# Patient Record
Sex: FEMALE | Race: WHITE | NOT HISPANIC OR LATINO | URBAN - METROPOLITAN AREA
[De-identification: names, ages, dates, MRNs, and addresses within clinical notes are randomized per-mention and may not be internally consistent; named-entity substitution may affect disease eponyms.]

---

## 2017-03-03 ENCOUNTER — APPOINTMENT (OUTPATIENT)
Dept: URBAN - METROPOLITAN AREA CLINIC 200 | Age: 62
Setting detail: DERMATOLOGY
End: 2017-03-03

## 2017-03-03 DIAGNOSIS — Z41.9 ENCOUNTER FOR PROCEDURE FOR PURPOSES OTHER THAN REMEDYING HEALTH STATE, UNSPECIFIED: ICD-10-CM

## 2017-03-03 DIAGNOSIS — L57.0 ACTINIC KERATOSIS: ICD-10-CM

## 2017-03-03 DIAGNOSIS — L57.8 OTHER SKIN CHANGES DUE TO CHRONIC EXPOSURE TO NONIONIZING RADIATION: ICD-10-CM

## 2017-03-03 DIAGNOSIS — D22 MELANOCYTIC NEVI: ICD-10-CM

## 2017-03-03 PROBLEM — D22.5 MELANOCYTIC NEVI OF TRUNK: Status: ACTIVE | Noted: 2017-03-03

## 2017-03-03 PROCEDURE — OTHER LIQUID NITROGEN (COSMETIC): OTHER

## 2017-03-03 PROCEDURE — 99213 OFFICE O/P EST LOW 20 MIN: CPT | Mod: 25

## 2017-03-03 PROCEDURE — 17000 DESTRUCT PREMALG LESION: CPT

## 2017-03-03 PROCEDURE — 17003 DESTRUCT PREMALG LES 2-14: CPT

## 2017-03-03 PROCEDURE — OTHER LIQUID NITROGEN: OTHER

## 2017-03-03 PROCEDURE — OTHER COUNSELING: OTHER

## 2017-03-03 ASSESSMENT — LOCATION DETAILED DESCRIPTION DERM
LOCATION DETAILED: LEFT LATERAL SUPERIOR CHEST
LOCATION DETAILED: UPPER STERNUM
LOCATION DETAILED: RIGHT SUPERIOR FOREHEAD
LOCATION DETAILED: RIGHT POSTERIOR SHOULDER
LOCATION DETAILED: UPPER STERNUM

## 2017-03-03 ASSESSMENT — LOCATION ZONE DERM
LOCATION ZONE: TRUNK
LOCATION ZONE: FACE
LOCATION ZONE: TRUNK
LOCATION ZONE: ARM

## 2017-03-03 ASSESSMENT — LOCATION SIMPLE DESCRIPTION DERM
LOCATION SIMPLE: RIGHT FOREHEAD
LOCATION SIMPLE: CHEST
LOCATION SIMPLE: CHEST
LOCATION SIMPLE: RIGHT SHOULDER

## 2017-03-03 NOTE — PROCEDURE: LIQUID NITROGEN (COSMETIC)
Render Post-Care Instructions In Note?: no
Detail Level: Zone
Price (Use Numbers Only, No Special Characters Or $): 75.00
Post-Care Instructions: I reviewed with the patient in detail post-care instructions. Patient is to wear sunprotection, and avoid picking at any of the treated lesions. Pt may apply Vaseline to crusted or scabbing areas.
Consent: The patient's consent was obtained including but not limited to risks of crusting, scabbing, blistering, scarring, darker or lighter pigmentary change, recurrence, incomplete removal and infection. The patient understands that the procedure is cosmetic in nature and is not covered by insurance.

## 2017-10-10 ENCOUNTER — APPOINTMENT (OUTPATIENT)
Dept: URBAN - METROPOLITAN AREA CLINIC 200 | Age: 62
Setting detail: DERMATOLOGY
End: 2017-10-15

## 2017-10-10 DIAGNOSIS — L57.0 ACTINIC KERATOSIS: ICD-10-CM

## 2017-10-10 DIAGNOSIS — L57.8 OTHER SKIN CHANGES DUE TO CHRONIC EXPOSURE TO NONIONIZING RADIATION: ICD-10-CM

## 2017-10-10 DIAGNOSIS — L20.84 INTRINSIC (ALLERGIC) ECZEMA: ICD-10-CM

## 2017-10-10 DIAGNOSIS — L81.4 OTHER MELANIN HYPERPIGMENTATION: ICD-10-CM

## 2017-10-10 PROBLEM — Z85.828 PERSONAL HISTORY OF OTHER MALIGNANT NEOPLASM OF SKIN: Status: ACTIVE | Noted: 2017-10-10

## 2017-10-10 PROCEDURE — 99213 OFFICE O/P EST LOW 20 MIN: CPT | Mod: 25

## 2017-10-10 PROCEDURE — OTHER COUNSELING: OTHER

## 2017-10-10 PROCEDURE — OTHER LIQUID NITROGEN: OTHER

## 2017-10-10 PROCEDURE — OTHER OBSERVATION: OTHER

## 2017-10-10 PROCEDURE — 17003 DESTRUCT PREMALG LES 2-14: CPT

## 2017-10-10 PROCEDURE — 17000 DESTRUCT PREMALG LESION: CPT

## 2017-10-10 PROCEDURE — OTHER PRESCRIPTION: OTHER

## 2017-10-10 PROCEDURE — OTHER OBSERVATION AND MEASURE: OTHER

## 2017-10-10 RX ORDER — TRIAMCINOLONE ACETONIDE 1 MG/G
CREAM TOPICAL
Qty: 1 | Refills: 6 | Status: ERX

## 2017-10-10 ASSESSMENT — LOCATION ZONE DERM
LOCATION ZONE: FACE
LOCATION ZONE: TRUNK
LOCATION ZONE: NECK

## 2017-10-10 ASSESSMENT — LOCATION SIMPLE DESCRIPTION DERM
LOCATION SIMPLE: RIGHT UPPER BACK
LOCATION SIMPLE: LEFT CHEEK
LOCATION SIMPLE: LEFT ZYGOMA
LOCATION SIMPLE: LEFT ANTERIOR NECK
LOCATION SIMPLE: NECK
LOCATION SIMPLE: CHEST

## 2017-10-10 ASSESSMENT — LOCATION DETAILED DESCRIPTION DERM
LOCATION DETAILED: LEFT CENTRAL LATERAL NECK
LOCATION DETAILED: LEFT SUPERIOR CENTRAL MALAR CHEEK
LOCATION DETAILED: RIGHT MEDIAL SUPERIOR CHEST
LOCATION DETAILED: LEFT MEDIAL SUPERIOR CHEST
LOCATION DETAILED: MIDDLE STERNUM
LOCATION DETAILED: LEFT SUPERIOR LATERAL NECK
LOCATION DETAILED: RIGHT MID-UPPER BACK
LOCATION DETAILED: LEFT CENTRAL ZYGOMA

## 2017-10-10 NOTE — PROCEDURE: LIQUID NITROGEN
Post-Care Instructions: I reviewed with the patient in detail post-care instructions. Patient is to wear sunprotection, and avoid picking at any of the treated lesions. Pt may apply Vaseline to crusted or scabbing areas.
Number Of Freeze-Thaw Cycles: 2 freeze-thaw cycles
Render Post-Care Instructions In Note?: no
Detail Level: Detailed
Duration Of Freeze Thaw-Cycle (Seconds): 10
Consent: The patient's consent was obtained including but not limited to risks of crusting, scabbing, blistering, scarring, darker or lighter pigmentary change, recurrence, incomplete removal and infection.

## 2018-11-12 ENCOUNTER — APPOINTMENT (OUTPATIENT)
Dept: URBAN - METROPOLITAN AREA CLINIC 200 | Age: 63
Setting detail: DERMATOLOGY
End: 2018-11-15

## 2018-11-12 DIAGNOSIS — L57.8 OTHER SKIN CHANGES DUE TO CHRONIC EXPOSURE TO NONIONIZING RADIATION: ICD-10-CM

## 2018-11-12 DIAGNOSIS — L57.0 ACTINIC KERATOSIS: ICD-10-CM

## 2018-11-12 PROCEDURE — 17003 DESTRUCT PREMALG LES 2-14: CPT

## 2018-11-12 PROCEDURE — 99213 OFFICE O/P EST LOW 20 MIN: CPT | Mod: 25

## 2018-11-12 PROCEDURE — 17000 DESTRUCT PREMALG LESION: CPT

## 2018-11-12 PROCEDURE — OTHER COUNSELING: OTHER

## 2018-11-12 PROCEDURE — OTHER MIPS QUALITY: OTHER

## 2018-11-12 PROCEDURE — OTHER LIQUID NITROGEN: OTHER

## 2018-11-12 ASSESSMENT — LOCATION ZONE DERM
LOCATION ZONE: TRUNK
LOCATION ZONE: FACE
LOCATION ZONE: NECK

## 2018-11-12 ASSESSMENT — LOCATION SIMPLE DESCRIPTION DERM
LOCATION SIMPLE: RIGHT CHEEK
LOCATION SIMPLE: NECK
LOCATION SIMPLE: CHEST
LOCATION SIMPLE: LEFT CHEEK

## 2018-11-12 ASSESSMENT — LOCATION DETAILED DESCRIPTION DERM
LOCATION DETAILED: RIGHT SUPERIOR LATERAL NECK
LOCATION DETAILED: RIGHT SUPERIOR CENTRAL MALAR CHEEK
LOCATION DETAILED: LEFT MEDIAL SUPERIOR CHEST
LOCATION DETAILED: LEFT SUPERIOR MEDIAL BUCCAL CHEEK

## 2019-04-30 ENCOUNTER — PATIENT OUTREACH (OUTPATIENT)
Dept: FAMILY MEDICINE | Facility: CLINIC | Age: 64
End: 2019-04-30

## 2019-04-30 NOTE — PROGRESS NOTES
Care Gap Team has outreached to Silke Ashraf on behalf of their primary care provider.      Care Gap Source:: IPPIP Gap Report         Care Gap Status:: Due    Outreach via:: Telephone    Adult Preventive Wellness Protocol(s) Used: : Breast Cancer Screening    Chart Review Completed:: Yes  Patient interview completed:: Yes  Inclusion Criteria:: Met  Exclusion Criteria: None  Patient educated on recommended care:: Yes       Provided order(s) for:: none    Provided clinical referral(s) for:: None    Appointment provided:: No                Addressed patient's questions and concerns. Patient states that she is no longer a patient with the practice.

## 2019-05-13 ENCOUNTER — APPOINTMENT (OUTPATIENT)
Dept: URBAN - METROPOLITAN AREA CLINIC 200 | Age: 64
Setting detail: DERMATOLOGY
End: 2019-05-14

## 2019-05-13 DIAGNOSIS — L57.8 OTHER SKIN CHANGES DUE TO CHRONIC EXPOSURE TO NONIONIZING RADIATION: ICD-10-CM

## 2019-05-13 DIAGNOSIS — L57.0 ACTINIC KERATOSIS: ICD-10-CM

## 2019-05-13 DIAGNOSIS — Z85.828 PERSONAL HISTORY OF OTHER MALIGNANT NEOPLASM OF SKIN: ICD-10-CM

## 2019-05-13 PROCEDURE — 17003 DESTRUCT PREMALG LES 2-14: CPT

## 2019-05-13 PROCEDURE — OTHER COUNSELING: OTHER

## 2019-05-13 PROCEDURE — 17000 DESTRUCT PREMALG LESION: CPT

## 2019-05-13 PROCEDURE — OTHER LIQUID NITROGEN: OTHER

## 2019-05-13 PROCEDURE — 99213 OFFICE O/P EST LOW 20 MIN: CPT | Mod: 25

## 2019-05-13 PROCEDURE — OTHER MIPS QUALITY: OTHER

## 2019-05-13 ASSESSMENT — LOCATION SIMPLE DESCRIPTION DERM
LOCATION SIMPLE: CHEST
LOCATION SIMPLE: RIGHT ANTERIOR NECK
LOCATION SIMPLE: LEFT ANTERIOR NECK
LOCATION SIMPLE: RIGHT LOWER LEG

## 2019-05-13 ASSESSMENT — LOCATION DETAILED DESCRIPTION DERM
LOCATION DETAILED: RIGHT CLAVICULAR NECK
LOCATION DETAILED: LEFT MEDIAL SUPERIOR CHEST
LOCATION DETAILED: RIGHT LATERAL DISTAL CALF
LOCATION DETAILED: LEFT INFERIOR ANTERIOR NECK

## 2019-05-13 ASSESSMENT — LOCATION ZONE DERM
LOCATION ZONE: LEG
LOCATION ZONE: NECK
LOCATION ZONE: TRUNK

## 2019-07-01 NOTE — PROCEDURE: LIQUID NITROGEN
Case management 7/1  Talked with mother as she had left a  requesting a call back. She reported that she was told that we were reassessing today with potential discharge on Tuesday. She reported that Tuesday works best for her as she has made arrangements to take off early. She has something going tonight and would have difficulty picking her up. Chhaya's intent at this time is to take Arlene and her on a road trip to clear the air and to work on communication. She is planning a on a long weekend trip and then they will be back before the intake scheduled on 7/9 with [facility]. Informed her that I was going to team at 1030 and will contact her afterwards. Informed her that my conversation with Dayanna last Friday was that they working on scheduling MSFT. Chhaya reported that she is going to contact Day[facility] to learn more.    [facility] and she reported that they have a MSFT therapist and that person is going to contact mother to set up appointments. Dayanna requested the discharge summary with recommendations. Informed her that I will send those to her after discharge.    Talked with mother Chhaya and made arrangements for her to pick Arlene up on Tuesday 7/2 1400.   
Detail Level: Generalized
Post-Care Instructions: I reviewed with the patient in detail post-care instructions. Patient is to wear sunprotection, and avoid picking at any of the treated lesions. Pt may apply Vaseline to crusted or scabbing areas.
Render Post-Care Instructions In Note?: no
Number Of Freeze-Thaw Cycles: 2 freeze-thaw cycles
Consent: The patient's consent was obtained including but not limited to risks of crusting, scabbing, blistering, scarring, darker or lighter pigmentary change, recurrence, incomplete removal and infection.
Duration Of Freeze Thaw-Cycle (Seconds): 10

## 2019-11-18 ENCOUNTER — APPOINTMENT (OUTPATIENT)
Dept: URBAN - METROPOLITAN AREA CLINIC 200 | Age: 64
Setting detail: DERMATOLOGY
End: 2019-11-26

## 2019-11-18 DIAGNOSIS — L57.8 OTHER SKIN CHANGES DUE TO CHRONIC EXPOSURE TO NONIONIZING RADIATION: ICD-10-CM

## 2019-11-18 DIAGNOSIS — L20.84 INTRINSIC (ALLERGIC) ECZEMA: ICD-10-CM

## 2019-11-18 DIAGNOSIS — Z85.828 PERSONAL HISTORY OF OTHER MALIGNANT NEOPLASM OF SKIN: ICD-10-CM

## 2019-11-18 PROBLEM — L57.0 ACTINIC KERATOSIS: Status: ACTIVE | Noted: 2019-11-18

## 2019-11-18 PROCEDURE — OTHER PRESCRIPTION: OTHER

## 2019-11-18 PROCEDURE — 99213 OFFICE O/P EST LOW 20 MIN: CPT

## 2019-11-18 PROCEDURE — OTHER COUNSELING: OTHER

## 2019-11-18 RX ORDER — BETAMETHASONE DIPROPIONATE 0.5 MG/G
CREAM TOPICAL
Qty: 1 | Refills: 6 | Status: ERX | COMMUNITY
Start: 2019-11-18

## 2019-11-18 ASSESSMENT — LOCATION ZONE DERM
LOCATION ZONE: TRUNK
LOCATION ZONE: NECK

## 2019-11-18 ASSESSMENT — LOCATION DETAILED DESCRIPTION DERM
LOCATION DETAILED: UPPER STERNUM
LOCATION DETAILED: LEFT MEDIAL SUPERIOR CHEST
LOCATION DETAILED: LEFT INFERIOR ANTERIOR NECK

## 2019-11-18 ASSESSMENT — LOCATION SIMPLE DESCRIPTION DERM
LOCATION SIMPLE: CHEST
LOCATION SIMPLE: LEFT ANTERIOR NECK

## 2019-11-18 ASSESSMENT — BSA RASH: BSA RASH: 24

## 2020-04-15 ENCOUNTER — RX ONLY (RX ONLY)
Age: 65
End: 2020-04-15

## 2020-04-15 RX ORDER — BETAMETHASONE DIPROPIONATE 0.5 MG/G
CREAM TOPICAL
Qty: 1 | Refills: 6 | Status: ERX

## 2020-07-09 ENCOUNTER — APPOINTMENT (OUTPATIENT)
Dept: URBAN - METROPOLITAN AREA CLINIC 200 | Age: 65
Setting detail: DERMATOLOGY
End: 2020-07-19

## 2020-07-09 ENCOUNTER — RX ONLY (RX ONLY)
Age: 65
End: 2020-07-09

## 2020-07-09 DIAGNOSIS — L57.0 ACTINIC KERATOSIS: ICD-10-CM

## 2020-07-09 DIAGNOSIS — L20.84 INTRINSIC (ALLERGIC) ECZEMA: ICD-10-CM

## 2020-07-09 DIAGNOSIS — B07.8 OTHER VIRAL WARTS: ICD-10-CM

## 2020-07-09 PROCEDURE — 17003 DESTRUCT PREMALG LES 2-14: CPT | Mod: 59

## 2020-07-09 PROCEDURE — 17110 DESTRUCT B9 LESION 1-14: CPT

## 2020-07-09 PROCEDURE — OTHER PRESCRIPTION MEDICATION MANAGEMENT: OTHER

## 2020-07-09 PROCEDURE — 17000 DESTRUCT PREMALG LESION: CPT | Mod: 59

## 2020-07-09 PROCEDURE — OTHER LIQUID NITROGEN: OTHER

## 2020-07-09 PROCEDURE — OTHER PRESCRIPTION: OTHER

## 2020-07-09 PROCEDURE — 99213 OFFICE O/P EST LOW 20 MIN: CPT | Mod: 25

## 2020-07-09 RX ORDER — CLOBETASOL PROPIONATE 0.5 MG/G
CREAM TOPICAL
Qty: 1 | Refills: 6 | Status: ERX | COMMUNITY
Start: 2020-07-09

## 2020-07-09 RX ORDER — BETAMETHASONE DIPROPIONATE 0.5 MG/G
CREAM, AUGMENTED TOPICAL
Qty: 1 | Refills: 6 | Status: ERX

## 2020-07-09 RX ORDER — LORATADINE 10 MG
TABLET ORAL
Qty: 120 | Refills: 6 | COMMUNITY
Start: 2020-07-09

## 2020-07-09 ASSESSMENT — LOCATION DETAILED DESCRIPTION DERM
LOCATION DETAILED: RIGHT DISTAL POSTERIOR UPPER ARM
LOCATION DETAILED: LEFT ANTECUBITAL SKIN
LOCATION DETAILED: LEFT PROXIMAL PRETIBIAL REGION
LOCATION DETAILED: LEFT CENTRAL MALAR CHEEK
LOCATION DETAILED: RIGHT RADIAL DORSAL HAND
LOCATION DETAILED: LEFT PROXIMAL PRETIBIAL REGION
LOCATION DETAILED: LEFT DISTAL CALF
LOCATION DETAILED: LEFT DISTAL PRETIBIAL REGION
LOCATION DETAILED: LEFT PROXIMAL CALF
LOCATION DETAILED: LEFT SUPERIOR LATERAL NECK
LOCATION DETAILED: LEFT POSTERIOR SHOULDER

## 2020-07-09 ASSESSMENT — LOCATION SIMPLE DESCRIPTION DERM
LOCATION SIMPLE: LEFT PRETIBIAL REGION
LOCATION SIMPLE: RIGHT POSTERIOR UPPER ARM
LOCATION SIMPLE: LEFT CHEEK
LOCATION SIMPLE: LEFT PRETIBIAL REGION
LOCATION SIMPLE: NECK
LOCATION SIMPLE: LEFT SHOULDER
LOCATION SIMPLE: LEFT CALF
LOCATION SIMPLE: RIGHT HAND
LOCATION SIMPLE: LEFT UPPER ARM

## 2020-07-09 ASSESSMENT — LOCATION ZONE DERM
LOCATION ZONE: LEG
LOCATION ZONE: FACE
LOCATION ZONE: NECK
LOCATION ZONE: HAND
LOCATION ZONE: ARM
LOCATION ZONE: LEG

## 2020-07-09 NOTE — PROCEDURE: LIQUID NITROGEN
Include Z78.9 (Other Specified Conditions Influencing Health Status) As An Associated Diagnosis?: Yes
Add 52 Modifier (Optional): no
Number Of Freeze-Thaw Cycles: 1 freeze-thaw cycle
Post-Care Instructions: I reviewed with the patient in detail post-care instructions. Patient is to wear sunprotection, and avoid picking at any of the treated lesions. Pt may apply Vaseline to crusted or scabbing areas.
Number Of Freeze-Thaw Cycles: 2 freeze-thaw cycles
Consent: The patient's consent was obtained including but not limited to risks of crusting, scabbing, blistering, scarring, darker or lighter pigmentary change, recurrence, incomplete removal and infection.
Medical Necessity Clause: This procedure was medically necessary because the lesions that were treated were: causing pain
Detail Level: Detailed
Duration Of Freeze Thaw-Cycle (Seconds): 2
Medical Necessity Information: It is in your best interest to select a reason for this procedure from the list below. All of these items fulfill various CMS LCD requirements except the new and changing color options.

## 2020-12-30 ENCOUNTER — APPOINTMENT (OUTPATIENT)
Dept: URBAN - METROPOLITAN AREA CLINIC 200 | Age: 65
Setting detail: DERMATOLOGY
End: 2020-12-31

## 2020-12-30 DIAGNOSIS — L28.0 LICHEN SIMPLEX CHRONICUS: ICD-10-CM

## 2020-12-30 DIAGNOSIS — L20.84 INTRINSIC (ALLERGIC) ECZEMA: ICD-10-CM

## 2020-12-30 DIAGNOSIS — Z85.828 PERSONAL HISTORY OF OTHER MALIGNANT NEOPLASM OF SKIN: ICD-10-CM

## 2020-12-30 DIAGNOSIS — L57.8 OTHER SKIN CHANGES DUE TO CHRONIC EXPOSURE TO NONIONIZING RADIATION: ICD-10-CM

## 2020-12-30 PROBLEM — L57.0 ACTINIC KERATOSIS: Status: ACTIVE | Noted: 2020-12-30

## 2020-12-30 PROCEDURE — 17110 DESTRUCT B9 LESION 1-14: CPT

## 2020-12-30 PROCEDURE — 99213 OFFICE O/P EST LOW 20 MIN: CPT | Mod: 25

## 2020-12-30 PROCEDURE — OTHER COUNSELING: OTHER

## 2020-12-30 PROCEDURE — OTHER MIPS QUALITY: OTHER

## 2020-12-30 PROCEDURE — OTHER LIQUID NITROGEN: OTHER

## 2020-12-30 ASSESSMENT — LOCATION SIMPLE DESCRIPTION DERM
LOCATION SIMPLE: CHEST
LOCATION SIMPLE: LEFT HAND
LOCATION SIMPLE: LEFT ANTERIOR NECK
LOCATION SIMPLE: RIGHT UPPER ARM
LOCATION SIMPLE: LEFT THIGH
LOCATION SIMPLE: LEFT PRETIBIAL REGION
LOCATION SIMPLE: RIGHT THIGH
LOCATION SIMPLE: LEFT UPPER ARM

## 2020-12-30 ASSESSMENT — LOCATION DETAILED DESCRIPTION DERM
LOCATION DETAILED: LEFT MEDIAL SUPERIOR CHEST
LOCATION DETAILED: LEFT PROXIMAL PRETIBIAL REGION
LOCATION DETAILED: LEFT INFERIOR ANTERIOR NECK
LOCATION DETAILED: LEFT RADIAL DORSAL HAND
LOCATION DETAILED: LEFT ANTERIOR DISTAL THIGH
LOCATION DETAILED: RIGHT ANTERIOR DISTAL THIGH
LOCATION DETAILED: RIGHT ANTERIOR DISTAL UPPER ARM
LOCATION DETAILED: LEFT ANTERIOR DISTAL UPPER ARM

## 2020-12-30 ASSESSMENT — LOCATION ZONE DERM
LOCATION ZONE: NECK
LOCATION ZONE: ARM
LOCATION ZONE: LEG
LOCATION ZONE: ARM
LOCATION ZONE: TRUNK
LOCATION ZONE: HAND
LOCATION ZONE: LEG

## 2020-12-30 NOTE — PROCEDURE: COUNSELING
Detail Level: Zone
Detail Level: Generalized
Patient Specific Counseling (Will Not Stick From Patient To Patient): Allegra 120mg

## 2020-12-30 NOTE — PROCEDURE: LIQUID NITROGEN
Medical Necessity Information: It is in your best interest to select a reason for this procedure from the list below. All of these items fulfill various CMS LCD requirements except the new and changing color options.
Medical Necessity Clause: This procedure was medically necessary because the lesions that were treated were: causing pain
Number Of Freeze-Thaw Cycles: 2 freeze-thaw cycles
Render Post-Care Instructions In Note?: no
Consent: The patient's consent was obtained including but not limited to risks of crusting, scabbing, blistering, scarring, darker or lighter pigmentary change, recurrence, incomplete removal and infection.
Include Z78.9 (Other Specified Conditions Influencing Health Status) As An Associated Diagnosis?: Yes
Post-Care Instructions: I reviewed with the patient in detail post-care instructions. Patient is to wear sunprotection, and avoid picking at any of the treated lesions. Pt may apply Vaseline to crusted or scabbing areas.
Detail Level: Detailed

## 2021-07-08 ENCOUNTER — APPOINTMENT (OUTPATIENT)
Dept: URBAN - METROPOLITAN AREA CLINIC 200 | Age: 66
Setting detail: DERMATOLOGY
End: 2021-07-08

## 2021-07-08 DIAGNOSIS — Z11.52 ENCOUNTER FOR SCREENING FOR COVID-19: ICD-10-CM

## 2021-07-08 DIAGNOSIS — L57.8 OTHER SKIN CHANGES DUE TO CHRONIC EXPOSURE TO NONIONIZING RADIATION: ICD-10-CM

## 2021-07-08 DIAGNOSIS — Z85.828 PERSONAL HISTORY OF OTHER MALIGNANT NEOPLASM OF SKIN: ICD-10-CM

## 2021-07-08 DIAGNOSIS — L20.84 INTRINSIC (ALLERGIC) ECZEMA: ICD-10-CM

## 2021-07-08 DIAGNOSIS — D485 NEOPLASM OF UNCERTAIN BEHAVIOR OF SKIN: ICD-10-CM

## 2021-07-08 PROBLEM — L57.0 ACTINIC KERATOSIS: Status: ACTIVE | Noted: 2021-07-08

## 2021-07-08 PROBLEM — D48.5 NEOPLASM OF UNCERTAIN BEHAVIOR OF SKIN: Status: ACTIVE | Noted: 2021-07-08

## 2021-07-08 PROCEDURE — OTHER SCREENING FOR COVID-19: OTHER

## 2021-07-08 PROCEDURE — OTHER SUNSCREEN RECOMMENDATIONS: OTHER

## 2021-07-08 PROCEDURE — OTHER PRESCRIPTION MEDICATION MANAGEMENT: OTHER

## 2021-07-08 PROCEDURE — OTHER BIOPSY BY SHAVE METHOD: OTHER

## 2021-07-08 PROCEDURE — 11103 TANGNTL BX SKIN EA SEP/ADDL: CPT

## 2021-07-08 PROCEDURE — OTHER COUNSELING: OTHER

## 2021-07-08 PROCEDURE — OTHER PRESCRIPTION: OTHER

## 2021-07-08 PROCEDURE — 99213 OFFICE O/P EST LOW 20 MIN: CPT | Mod: 25

## 2021-07-08 PROCEDURE — OTHER PHOTO-DOCUMENTATION: OTHER

## 2021-07-08 PROCEDURE — 11102 TANGNTL BX SKIN SINGLE LES: CPT

## 2021-07-08 ASSESSMENT — LOCATION DETAILED DESCRIPTION DERM
LOCATION DETAILED: RIGHT DISTAL PRETIBIAL REGION
LOCATION DETAILED: LEFT INFERIOR ANTERIOR NECK
LOCATION DETAILED: PERIUMBILICAL SKIN
LOCATION DETAILED: LEFT INFRAMAMMARY CREASE (OUTER QUADRANT)

## 2021-07-08 ASSESSMENT — LOCATION SIMPLE DESCRIPTION DERM
LOCATION SIMPLE: RIGHT PRETIBIAL REGION
LOCATION SIMPLE: LEFT ANTERIOR NECK
LOCATION SIMPLE: ABDOMEN
LOCATION SIMPLE: LEFT BREAST

## 2021-07-08 ASSESSMENT — PAIN INTENSITY VAS: HOW INTENSE IS YOUR PAIN 0 BEING NO PAIN, 10 BEING THE MOST SEVERE PAIN POSSIBLE?: NO PAIN

## 2021-07-08 ASSESSMENT — LOCATION ZONE DERM
LOCATION ZONE: TRUNK
LOCATION ZONE: LEG
LOCATION ZONE: NECK

## 2021-07-08 NOTE — PROCEDURE: BIOPSY BY SHAVE METHOD
Electrodesiccation And Curettage Text: The wound bed was treated with electrodesiccation and curettage after the biopsy was performed.
Type Of Destruction Used: Curettage
Depth Of Biopsy: dermis
Validate That Anesthesia Is Not 0: No
Validate Note Data (See Information Below): Yes
Dressing: bandage
Wound Care: Aquaphor
Electrodesiccation Text: The wound bed was treated with electrodesiccation after the biopsy was performed.
Anesthesia Volume In Cc (Will Not Render If 0): 0.5
Additional Anesthesia Volume In Cc (Will Not Render If 0): 0
Cryotherapy Text: The wound bed was treated with cryotherapy after the biopsy was performed.
Consent: Written consent was obtained and risks were reviewed including but not limited to scarring, infection, bleeding, scabbing, incomplete removal, nerve damage and allergy to anesthesia.
Notification Instructions: Patient will be notified of biopsy results. However, patient instructed to call the office if not contacted within 2 weeks.
Post-Care Instructions: I reviewed with the patient in detail post-care instructions. Patient is to keep the biopsy site dry overnight, and then apply bacitracin twice daily until healed. Patient may apply hydrogen peroxide soaks to remove any crusting.
Detail Level: Detailed
Curettage Text: The wound bed was treated with curettage after the biopsy was performed.
Biopsy Type: H and E
Hemostasis: Drysol
Information: Selecting Yes will display possible errors in your note based on the variables you have selected. This validation is only offered as a suggestion for you. PLEASE NOTE THAT THE VALIDATION TEXT WILL BE REMOVED WHEN YOU FINALIZE YOUR NOTE. IF YOU WANT TO FAX A PRELIMINARY NOTE YOU WILL NEED TO TOGGLE THIS TO 'NO' IF YOU DO NOT WANT IT IN YOUR FAXED NOTE.
Anesthesia Type: 0.5% lidocaine with 1:200,000 epinephrine
Biopsy Method: sterile single edge surgical blade
Silver Nitrate Text: The wound bed was treated with silver nitrate after the biopsy was performed.
Billing Type: Third-Party Bill

## 2021-07-08 NOTE — PROCEDURE: PRESCRIPTION MEDICATION MANAGEMENT
Render In Strict Bullet Format?: No
Initiate Treatment: doxepin/zinc \\n5% doxepin HCl (bulk) 2.25g in 94% Moisturizing Cream 42.3g, 1% pyrithione zinc (bulk)
Detail Level: Detailed

## 2021-07-08 NOTE — PROCEDURE: SCREENING FOR COVID-19
Detail Level: Generalized
Previous Infection Text: The patient has recovered from a previous COVID-19 infection.
Has The Patient Been Infected With Covid-19 In The Past?: No

## 2021-10-07 ENCOUNTER — APPOINTMENT (OUTPATIENT)
Dept: URBAN - METROPOLITAN AREA CLINIC 200 | Age: 66
Setting detail: DERMATOLOGY
End: 2021-10-08

## 2021-10-07 DIAGNOSIS — I78.1 NEVUS, NON-NEOPLASTIC: ICD-10-CM

## 2021-10-07 DIAGNOSIS — Z11.52 ENCOUNTER FOR SCREENING FOR COVID-19: ICD-10-CM

## 2021-10-07 PROBLEM — L57.0 ACTINIC KERATOSIS: Status: ACTIVE | Noted: 2021-10-07

## 2021-10-07 PROCEDURE — 99212 OFFICE O/P EST SF 10 MIN: CPT

## 2021-10-07 PROCEDURE — OTHER SCREENING FOR COVID-19: OTHER

## 2021-10-07 PROCEDURE — OTHER COUNSELING: OTHER

## 2021-10-07 PROCEDURE — OTHER REASSURANCE: OTHER

## 2021-10-07 ASSESSMENT — LOCATION SIMPLE DESCRIPTION DERM
LOCATION SIMPLE: ABDOMEN
LOCATION SIMPLE: RIGHT CHEEK

## 2021-10-07 ASSESSMENT — LOCATION ZONE DERM
LOCATION ZONE: FACE
LOCATION ZONE: TRUNK

## 2021-10-07 ASSESSMENT — LOCATION DETAILED DESCRIPTION DERM
LOCATION DETAILED: PERIUMBILICAL SKIN
LOCATION DETAILED: RIGHT MEDIAL MALAR CHEEK

## 2021-10-07 NOTE — PROCEDURE: REASSURANCE
Hide Additional Notes?: No
Detail Level: Simple
Additional Notes (Optional): Can treat with cosmetic visit if needed.

## 2022-02-17 ENCOUNTER — APPOINTMENT (OUTPATIENT)
Dept: URBAN - METROPOLITAN AREA CLINIC 200 | Age: 67
Setting detail: DERMATOLOGY
End: 2022-02-20

## 2022-02-17 DIAGNOSIS — Z11.52 ENCOUNTER FOR SCREENING FOR COVID-19: ICD-10-CM

## 2022-02-17 DIAGNOSIS — Z85.828 PERSONAL HISTORY OF OTHER MALIGNANT NEOPLASM OF SKIN: ICD-10-CM

## 2022-02-17 DIAGNOSIS — L20.84 INTRINSIC (ALLERGIC) ECZEMA: ICD-10-CM

## 2022-02-17 DIAGNOSIS — L28.1 PRURIGO NODULARIS: ICD-10-CM

## 2022-02-17 DIAGNOSIS — L57.8 OTHER SKIN CHANGES DUE TO CHRONIC EXPOSURE TO NONIONIZING RADIATION: ICD-10-CM

## 2022-02-17 DIAGNOSIS — L71.8 OTHER ROSACEA: ICD-10-CM

## 2022-02-17 PROCEDURE — OTHER MIPS QUALITY: OTHER

## 2022-02-17 PROCEDURE — OTHER SUNSCREEN RECOMMENDATIONS: OTHER

## 2022-02-17 PROCEDURE — OTHER LIQUID NITROGEN: OTHER

## 2022-02-17 PROCEDURE — OTHER RECOMMENDATIONS: OTHER

## 2022-02-17 PROCEDURE — 99213 OFFICE O/P EST LOW 20 MIN: CPT | Mod: 25

## 2022-02-17 PROCEDURE — OTHER COUNSELING: OTHER

## 2022-02-17 PROCEDURE — OTHER PRESCRIPTION: OTHER

## 2022-02-17 PROCEDURE — 17110 DESTRUCT B9 LESION 1-14: CPT

## 2022-02-17 PROCEDURE — OTHER SCREENING FOR COVID-19: OTHER

## 2022-02-17 RX ORDER — METRONIDAZOLE 7.5 MG/G
GEL TOPICAL
Qty: 45 | Refills: 4 | Status: ERX | COMMUNITY
Start: 2022-02-17

## 2022-02-17 ASSESSMENT — LOCATION DETAILED DESCRIPTION DERM
LOCATION DETAILED: LEFT ANTERIOR LATERAL DISTAL THIGH
LOCATION DETAILED: PERIUMBILICAL SKIN
LOCATION DETAILED: LEFT CENTRAL MALAR CHEEK
LOCATION DETAILED: LEFT INFERIOR ANTERIOR NECK
LOCATION DETAILED: RIGHT DISTAL PRETIBIAL REGION

## 2022-02-17 ASSESSMENT — LOCATION ZONE DERM
LOCATION ZONE: LEG
LOCATION ZONE: TRUNK
LOCATION ZONE: NECK
LOCATION ZONE: FACE

## 2022-02-17 ASSESSMENT — LOCATION SIMPLE DESCRIPTION DERM
LOCATION SIMPLE: ABDOMEN
LOCATION SIMPLE: LEFT THIGH
LOCATION SIMPLE: LEFT CHEEK
LOCATION SIMPLE: RIGHT PRETIBIAL REGION
LOCATION SIMPLE: LEFT ANTERIOR NECK

## 2022-02-17 NOTE — PROCEDURE: LIQUID NITROGEN
Spray Paint Text: The liquid nitrogen was applied to the skin utilizing a spray paint frosting technique.
Show Spray Paint Technique Variable?: Yes
Render Post-Care Instructions In Note?: no
Number Of Freeze-Thaw Cycles: 2 freeze-thaw cycles
Detail Level: Detailed
Pared With?: 15 blade
Consent: The patient's consent was obtained including but not limited to risks of crusting, scabbing, blistering, scarring, darker or lighter pigmentary change, recurrence, incomplete removal and infection.
Medical Necessity Clause: This procedure was medically necessary because the lesions that were treated were: causing pain
Post-Care Instructions: I reviewed with the patient in detail post-care instructions. Patient is to wear sunprotection, and avoid picking at any of the treated lesions. Pt may apply Vaseline to crusted or scabbing areas.
Medical Necessity Information: It is in your best interest to select a reason for this procedure from the list below. All of these items fulfill various CMS LCD requirements except the new and changing color options.
Aperture Size (Optional): B
Duration Of Freeze Thaw-Cycle (Seconds): 5-10

## 2022-02-17 NOTE — PROCEDURE: RECOMMENDATIONS
Render Risk Assessment In Note?: no
Detail Level: Simple
Recommendations (Free Text): Dupixent Reccomended

## 2022-02-24 PROCEDURE — 87075 CULTR BACTERIA EXCEPT BLOOD: CPT | Performed by: ORTHOPAEDIC SURGERY

## 2022-02-24 PROCEDURE — 88305 TISSUE EXAM BY PATHOLOGIST: CPT | Performed by: ORTHOPAEDIC SURGERY

## 2022-02-24 PROCEDURE — 87070 CULTURE OTHR SPECIMN AEROBIC: CPT | Performed by: ORTHOPAEDIC SURGERY

## 2022-02-25 ENCOUNTER — LAB REQUISITION (OUTPATIENT)
Dept: LAB | Facility: HOSPITAL | Age: 67
End: 2022-02-25
Payer: MEDICARE

## 2022-02-25 DIAGNOSIS — M23.262 DERANGEMENT OF OTHER LATERAL MENISCUS DUE TO OLD TEAR OR INJURY, LEFT KNEE: ICD-10-CM

## 2022-03-02 LAB
CASE RPRT: NORMAL
CLINICAL INFO: NORMAL
GRAM STN SPEC: NORMAL
MICROORGANISM SPEC CULT: NORMAL
MICROORGANISM SPEC CULT: NORMAL
PATH REPORT.FINAL DX SPEC: NORMAL
PATH REPORT.GROSS SPEC: NORMAL
PATH REPORT.MICROSCOPIC SPEC OTHER STN: NORMAL

## 2022-06-10 ENCOUNTER — APPOINTMENT (OUTPATIENT)
Dept: PREADMISSION TESTING | Facility: HOSPITAL | Age: 67
End: 2022-06-10
Payer: MEDICARE

## 2022-06-10 VITALS — HEIGHT: 67 IN | BODY MASS INDEX: 23.54 KG/M2 | WEIGHT: 150 LBS

## 2022-06-10 NOTE — PRE-PROCEDURE INSTRUCTIONS
1. We will call you between 3 pm and 7 pm on 0620/22 to inform you of arrival time for your procedure. If you do not hear by 6PM, please call [phone] for arrival time.     2. Please arrive through the Main Entrance of the [facility] (across from the parking garage) and report to the Surgery Registration Desk on the day of your procedure.    3. Please follow the following fasting guidelines:     No solid food EIGHT HOURS prior to surgery.  Unlimited clear liquids, meaning water or PLAIN black coffee WITHOUT any milk, cream, sugar, or sweetener are permitted up to TWO HOURS prior to arrival at the hospital.    4. Early on the morning of the procedure please take your usual dose of the listed medications with a sip of water:  none      5. Other Instructions: You may brush your teeth the morning of the procedure. Rinse and spit, do not swallow.  Bring a list of your medications with dosages.  Use surgical wash as directed.     6. If you develop a cold, cough, fever, rash, or other symptom prior to the day of the procedure, please report it to your physician immediately.    7. If you need to cancel the procedure for any reason, please contact your physician.    8. Make arrangements to have someone drive you home from the procedure. If you have not arranged for transportation home, your surgery may be cancelled.     9. You may not take public transportation unless accompanied by a responsible person.    10. You may not drive a car or operate complex or potentially dangerous machinery for 24 hours following anesthesia and/or sedation.    11.  If it is medically necessary for you to have a longer stay, you will be informed as soon as the decision is made.    12. Only bring essential items to the hospital.  Do not wear or bring anything of value to the hospital including jewelry of any kind, money, or wallet. Do not wear make-up or contact lenses.  DO NOT BRING MEDICATIONS FROM HOME unless instructed to do so. DO bring your  hearing aids, glasses, and a case    13. No lotion, creams, powders, or oils on skin the morning of procedure     14. Dress in comfortable clothes.    15.  If instructed, please bring a copy of your Advanced Directive (Living Will/Durable Power of ) on the day of your procedure.     16. Patients need to quarantine from the time of PAT COVID test to day of surgery, regardless of COVID vaccine status.      17. Ensuring your safety at all times is a very important part of our [facility] Culture of Safety. After having surgery and sedation, you are at risk for falling and balance issues. Although you may feel awake, the effects of the medication can last up to 24 hours after anesthesia. If you need to use the bathroom during your recovery period, nursing staff will escort you there and stay with you to ensure your safety.    18. Refrain from drinking alcohol and smoking cigarettes for 24 hours prior to surgery.    19. Shower with antibacterial soap (Dial) the night before and morning of your procedure.  If your procedure indicates the need for CHG antiseptic wash (Bactoshield or Hibiclens), please use this instead and follow instructions as discussed at the time of your Pre-Admission Testing phone interview or visit.    Above instructions reviewed with patient and patient acknowledges understanding.    Form explained by: Sosa Pat RN

## 2022-06-13 ENCOUNTER — TRANSCRIBE ORDERS (OUTPATIENT)
Dept: REGISTRATION | Facility: HOSPITAL | Age: 67
End: 2022-06-13

## 2022-06-13 ENCOUNTER — APPOINTMENT (OUTPATIENT)
Dept: LAB | Facility: HOSPITAL | Age: 67
End: 2022-06-13
Attending: ORTHOPAEDIC SURGERY
Payer: MEDICARE

## 2022-06-13 DIAGNOSIS — M17.12 UNILATERAL PRIMARY OSTEOARTHRITIS, LEFT KNEE: Primary | ICD-10-CM

## 2022-06-13 DIAGNOSIS — M17.12 UNILATERAL PRIMARY OSTEOARTHRITIS, LEFT KNEE: ICD-10-CM

## 2022-06-13 LAB
ABO + RH BLD: NORMAL
ALBUMIN SERPL-MCNC: 3.6 G/DL (ref 3.4–5)
ALP SERPL-CCNC: 91 IU/L (ref 35–126)
ALT SERPL-CCNC: 9 IU/L (ref 11–54)
ANION GAP SERPL CALC-SCNC: 9 MEQ/L (ref 3–15)
APTT PPP: 34 SEC (ref 23–35)
AST SERPL-CCNC: 18 IU/L (ref 15–41)
BACTERIA URNS QL MICRO: ABNORMAL /HPF
BILIRUB SERPL-MCNC: 0.4 MG/DL (ref 0.3–1.2)
BILIRUB UR QL STRIP.AUTO: NEGATIVE MG/DL
BLD GP AB SCN SERPL QL: NEGATIVE
BLOOD BANK CMNT PATIENT-IMP: NORMAL
BUN SERPL-MCNC: 14 MG/DL (ref 8–20)
CALCIUM SERPL-MCNC: 9.1 MG/DL (ref 8.9–10.3)
CHLORIDE SERPL-SCNC: 101 MEQ/L (ref 98–109)
CLARITY UR REFRACT.AUTO: CLEAR
CO2 SERPL-SCNC: 25 MEQ/L (ref 22–32)
COLOR UR AUTO: COLORLESS
CREAT SERPL-MCNC: 0.5 MG/DL (ref 0.6–1.1)
D AG BLD QL: POSITIVE
ERYTHROCYTE [DISTWIDTH] IN BLOOD BY AUTOMATED COUNT: 17.6 % (ref 11.7–14.4)
EST. AVERAGE GLUCOSE BLD GHB EST-MCNC: 111 MG/DL
GFR SERPL CREATININE-BSD FRML MDRD: >60 ML/MIN/1.73M*2
GLUCOSE SERPL-MCNC: 90 MG/DL (ref 70–99)
GLUCOSE UR STRIP.AUTO-MCNC: NEGATIVE MG/DL
HBA1C MFR BLD HPLC: 5.5 %
HCT VFR BLDCO AUTO: 38.8 % (ref 35–45)
HGB BLD-MCNC: 12 G/DL (ref 11.8–15.7)
HGB UR QL STRIP.AUTO: ABNORMAL
HYALINE CASTS #/AREA URNS LPF: ABNORMAL /LPF
INR PPP: 1
KETONES UR STRIP.AUTO-MCNC: NEGATIVE MG/DL
LABORATORY COMMENT REPORT: NORMAL
LEUKOCYTE ESTERASE UR QL STRIP.AUTO: 2
MCH RBC QN AUTO: 25.4 PG (ref 28–33.2)
MCHC RBC AUTO-ENTMCNC: 30.9 G/DL (ref 32.2–35.5)
MCV RBC AUTO: 82.2 FL (ref 83–98)
NITRITE UR QL STRIP.AUTO: NEGATIVE
PDW BLD AUTO: 8.6 FL (ref 9.4–12.3)
PH UR STRIP.AUTO: 6.5 [PH]
PLATELET # BLD AUTO: 418 K/UL (ref 150–369)
POTASSIUM SERPL-SCNC: 4.8 MEQ/L (ref 3.6–5.1)
PROT SERPL-MCNC: 6.6 G/DL (ref 6–8.2)
PROT UR QL STRIP.AUTO: NEGATIVE
PROTHROMBIN TIME: 13.4 SEC (ref 12.2–14.5)
RBC # BLD AUTO: 4.72 M/UL (ref 3.93–5.22)
RBC #/AREA URNS HPF: ABNORMAL /HPF
SODIUM SERPL-SCNC: 135 MEQ/L (ref 136–144)
SP GR UR REFRACT.AUTO: 1.01
SPECIMEN EXP DATE BLD: NORMAL
SQUAMOUS URNS QL MICRO: ABNORMAL /HPF
UROBILINOGEN UR STRIP-ACNC: 0.2 EU/DL
WBC # BLD AUTO: 7.45 K/UL (ref 3.8–10.5)
WBC #/AREA URNS HPF: ABNORMAL /HPF

## 2022-06-13 PROCEDURE — 83036 HEMOGLOBIN GLYCOSYLATED A1C: CPT | Mod: GA

## 2022-06-13 PROCEDURE — 85730 THROMBOPLASTIN TIME PARTIAL: CPT | Mod: GA

## 2022-06-13 PROCEDURE — 81001 URINALYSIS AUTO W/SCOPE: CPT

## 2022-06-13 PROCEDURE — 85610 PROTHROMBIN TIME: CPT | Mod: GA

## 2022-06-13 PROCEDURE — 86901 BLOOD TYPING SEROLOGIC RH(D): CPT

## 2022-06-13 PROCEDURE — 80053 COMPREHEN METABOLIC PANEL: CPT

## 2022-06-13 PROCEDURE — 85027 COMPLETE CBC AUTOMATED: CPT | Mod: GA

## 2022-06-13 PROCEDURE — 36415 COLL VENOUS BLD VENIPUNCTURE: CPT

## 2022-06-16 ENCOUNTER — APPOINTMENT (OUTPATIENT)
Dept: PREADMISSION TESTING | Facility: HOSPITAL | Age: 67
End: 2022-06-16
Attending: ORTHOPAEDIC SURGERY
Payer: MEDICARE

## 2022-06-16 DIAGNOSIS — M17.12 UNILATERAL PRIMARY OSTEOARTHRITIS, LEFT KNEE: ICD-10-CM

## 2022-06-16 LAB — SARS-COV-2 RNA RESP QL NAA+PROBE: NEGATIVE

## 2022-06-16 PROCEDURE — C9803 HOPD COVID-19 SPEC COLLECT: HCPCS

## 2022-06-16 PROCEDURE — U0003 INFECTIOUS AGENT DETECTION BY NUCLEIC ACID (DNA OR RNA); SEVERE ACUTE RESPIRATORY SYNDROME CORONAVIRUS 2 (SARS-COV-2) (CORONAVIRUS DISEASE [COVID-19]), AMPLIFIED PROBE TECHNIQUE, MAKING USE OF HIGH THROUGHPUT TECHNOLOGIES AS DESCRIBED BY CMS-2020-01-R: HCPCS

## 2022-06-20 ENCOUNTER — ANESTHESIA EVENT (OUTPATIENT)
Dept: OPERATING ROOM | Facility: HOSPITAL | Age: 67
Setting detail: HOSPITAL OUTPATIENT SURGERY
DRG: 470 | End: 2022-06-20
Payer: MEDICARE

## 2022-06-21 ENCOUNTER — ANESTHESIA (OUTPATIENT)
Dept: OPERATING ROOM | Facility: HOSPITAL | Age: 67
Setting detail: HOSPITAL OUTPATIENT SURGERY
DRG: 470 | End: 2022-06-21
Payer: MEDICARE

## 2022-06-21 ENCOUNTER — APPOINTMENT (INPATIENT)
Dept: RADIOLOGY | Facility: HOSPITAL | Age: 67
DRG: 470 | End: 2022-06-21
Attending: ORTHOPAEDIC SURGERY
Payer: MEDICARE

## 2022-06-21 ENCOUNTER — HOSPITAL ENCOUNTER (INPATIENT)
Facility: HOSPITAL | Age: 67
LOS: 1 days | Discharge: HOME HEALTH | DRG: 470 | End: 2022-06-22
Attending: ORTHOPAEDIC SURGERY | Admitting: ORTHOPAEDIC SURGERY
Payer: MEDICARE

## 2022-06-21 DIAGNOSIS — M17.12 OSTEOARTHRITIS OF LEFT KNEE, UNSPECIFIED OSTEOARTHRITIS TYPE: ICD-10-CM

## 2022-06-21 LAB
ABO + RH BLD: NORMAL
D AG BLD QL: POSITIVE
LABORATORY COMMENT REPORT: NORMAL

## 2022-06-21 PROCEDURE — 25000000 HC PHARMACY GENERAL: Performed by: ORTHOPAEDIC SURGERY

## 2022-06-21 PROCEDURE — 37000010 HC ANESTHESIA SPINAL: Performed by: ORTHOPAEDIC SURGERY

## 2022-06-21 PROCEDURE — 97162 PT EVAL MOD COMPLEX 30 MIN: CPT | Mod: GP

## 2022-06-21 PROCEDURE — 87015 SPECIMEN INFECT AGNT CONCNTJ: CPT | Performed by: ORTHOPAEDIC SURGERY

## 2022-06-21 PROCEDURE — 36000006 HC OR LEVEL 6 INITIAL 30MIN: Performed by: ORTHOPAEDIC SURGERY

## 2022-06-21 PROCEDURE — 71000001 HC PACU PHASE 1 INITIAL 30MIN: Performed by: ORTHOPAEDIC SURGERY

## 2022-06-21 PROCEDURE — 25000000 HC PHARMACY GENERAL: Performed by: NURSE ANESTHETIST, CERTIFIED REGISTERED

## 2022-06-21 PROCEDURE — C1776 JOINT DEVICE (IMPLANTABLE): HCPCS | Performed by: ORTHOPAEDIC SURGERY

## 2022-06-21 PROCEDURE — 63600000 HC DRUGS/DETAIL CODE: Performed by: NURSE ANESTHETIST, CERTIFIED REGISTERED

## 2022-06-21 PROCEDURE — C1713 ANCHOR/SCREW BN/BN,TIS/BN: HCPCS | Performed by: ORTHOPAEDIC SURGERY

## 2022-06-21 PROCEDURE — 63700000 HC SELF-ADMINISTRABLE DRUG: Performed by: NURSE PRACTITIONER

## 2022-06-21 PROCEDURE — 71000011 HC PACU PHASE 1 EA ADDL MIN: Performed by: ORTHOPAEDIC SURGERY

## 2022-06-21 PROCEDURE — 12000000 HC ROOM AND CARE MED/SURG

## 2022-06-21 PROCEDURE — 27200000 HC STERILE SUPPLY: Performed by: ORTHOPAEDIC SURGERY

## 2022-06-21 PROCEDURE — 87206 SMEAR FLUORESCENT/ACID STAI: CPT | Performed by: ORTHOPAEDIC SURGERY

## 2022-06-21 PROCEDURE — 63600000 HC DRUGS/DETAIL CODE: Performed by: ORTHOPAEDIC SURGERY

## 2022-06-21 PROCEDURE — 63600000 HC DRUGS/DETAIL CODE: Performed by: ANESTHESIOLOGY

## 2022-06-21 PROCEDURE — 88342 IMHCHEM/IMCYTCHM 1ST ANTB: CPT | Performed by: ORTHOPAEDIC SURGERY

## 2022-06-21 PROCEDURE — 36000016 HC OR LEVEL 6 EA ADDL MIN: Performed by: ORTHOPAEDIC SURGERY

## 2022-06-21 PROCEDURE — 87070 CULTURE OTHR SPECIMN AEROBIC: CPT | Performed by: ORTHOPAEDIC SURGERY

## 2022-06-21 PROCEDURE — 25800000 HC PHARMACY IV SOLUTIONS: Performed by: ORTHOPAEDIC SURGERY

## 2022-06-21 PROCEDURE — 25000000 HC PHARMACY GENERAL: Performed by: ANESTHESIOLOGY

## 2022-06-21 PROCEDURE — 87102 FUNGUS ISOLATION CULTURE: CPT | Performed by: ORTHOPAEDIC SURGERY

## 2022-06-21 PROCEDURE — 25800000 HC PHARMACY IV SOLUTIONS: Performed by: NURSE ANESTHETIST, CERTIFIED REGISTERED

## 2022-06-21 PROCEDURE — 73560 X-RAY EXAM OF KNEE 1 OR 2: CPT | Mod: LT

## 2022-06-21 PROCEDURE — 0SRD0J9 REPLACEMENT OF LEFT KNEE JOINT WITH SYNTHETIC SUBSTITUTE, CEMENTED, OPEN APPROACH: ICD-10-PCS | Performed by: ORTHOPAEDIC SURGERY

## 2022-06-21 PROCEDURE — 36415 COLL VENOUS BLD VENIPUNCTURE: CPT | Performed by: ORTHOPAEDIC SURGERY

## 2022-06-21 DEVICE — IMPLANTABLE DEVICE: Type: IMPLANTABLE DEVICE | Site: KNEE | Status: FUNCTIONAL

## 2022-06-21 DEVICE — ARCOM SERIES A PATELLA 3MM X 8MM: Type: IMPLANTABLE DEVICE | Site: KNEE | Status: FUNCTIONAL

## 2022-06-21 DEVICE — CEMENT BONE SIMPLEX FULL DOSE: Type: IMPLANTABLE DEVICE | Site: KNEE | Status: FUNCTIONAL

## 2022-06-21 DEVICE — TRAY TIBIAL INTERLOCK FIXED CRUCIATE 71MM: Type: IMPLANTABLE DEVICE | Site: KNEE | Status: FUNCTIONAL

## 2022-06-21 RX ORDER — FENTANYL CITRATE/PF 250MCG/5ML
SYRINGE (ML) INTRAVENOUS AS NEEDED
Status: DISCONTINUED | OUTPATIENT
Start: 2022-06-21 | End: 2022-06-21 | Stop reason: SURG

## 2022-06-21 RX ORDER — MORPHINE SULFATE 1 MG/ML
INJECTION, SOLUTION EPIDURAL; INTRATHECAL; INTRAVENOUS
Status: DISCONTINUED | OUTPATIENT
Start: 2022-06-21 | End: 2022-06-21 | Stop reason: HOSPADM

## 2022-06-21 RX ORDER — HYDROMORPHONE HYDROCHLORIDE 1 MG/ML
0.25 INJECTION, SOLUTION INTRAMUSCULAR; INTRAVENOUS; SUBCUTANEOUS EVERY 4 HOURS PRN
Status: DISCONTINUED | OUTPATIENT
Start: 2022-06-21 | End: 2022-06-22 | Stop reason: HOSPADM

## 2022-06-21 RX ORDER — OXYCODONE HYDROCHLORIDE 5 MG/1
5-10 TABLET ORAL EVERY 4 HOURS PRN
Status: DISCONTINUED | OUTPATIENT
Start: 2022-06-21 | End: 2022-06-22 | Stop reason: HOSPADM

## 2022-06-21 RX ORDER — PROPOFOL 10 MG/ML
INJECTION, EMULSION INTRAVENOUS CONTINUOUS PRN
Status: DISCONTINUED | OUTPATIENT
Start: 2022-06-21 | End: 2022-06-21 | Stop reason: SURG

## 2022-06-21 RX ORDER — KETOROLAC TROMETHAMINE 15 MG/ML
15 INJECTION, SOLUTION INTRAMUSCULAR; INTRAVENOUS EVERY 6 HOURS PRN
Status: DISCONTINUED | OUTPATIENT
Start: 2022-06-21 | End: 2022-06-21

## 2022-06-21 RX ORDER — MIDAZOLAM HYDROCHLORIDE 2 MG/2ML
INJECTION, SOLUTION INTRAMUSCULAR; INTRAVENOUS AS NEEDED
Status: DISCONTINUED | OUTPATIENT
Start: 2022-06-21 | End: 2022-06-21 | Stop reason: SURG

## 2022-06-21 RX ORDER — SODIUM CHLORIDE 9 MG/ML
INJECTION, SOLUTION INTRAMUSCULAR; INTRAVENOUS; SUBCUTANEOUS
Status: DISCONTINUED | OUTPATIENT
Start: 2022-06-21 | End: 2022-06-21 | Stop reason: HOSPADM

## 2022-06-21 RX ORDER — DEXTROSE 40 %
15-30 GEL (GRAM) ORAL AS NEEDED
Status: DISCONTINUED | OUTPATIENT
Start: 2022-06-21 | End: 2022-06-22 | Stop reason: HOSPADM

## 2022-06-21 RX ORDER — NAPROXEN SODIUM 220 MG/1
81 TABLET, FILM COATED ORAL 2 TIMES DAILY
Status: DISCONTINUED | OUTPATIENT
Start: 2022-06-21 | End: 2022-06-22 | Stop reason: HOSPADM

## 2022-06-21 RX ORDER — ONDANSETRON 4 MG/1
4 TABLET, ORALLY DISINTEGRATING ORAL EVERY 8 HOURS PRN
Status: DISCONTINUED | OUTPATIENT
Start: 2022-06-21 | End: 2022-06-22 | Stop reason: HOSPADM

## 2022-06-21 RX ORDER — DOCUSATE SODIUM 100 MG/1
100 CAPSULE, LIQUID FILLED ORAL 2 TIMES DAILY
Status: DISCONTINUED | OUTPATIENT
Start: 2022-06-21 | End: 2022-06-22 | Stop reason: HOSPADM

## 2022-06-21 RX ORDER — KETOROLAC TROMETHAMINE 15 MG/ML
15 INJECTION, SOLUTION INTRAMUSCULAR; INTRAVENOUS
Status: DISCONTINUED | OUTPATIENT
Start: 2022-06-21 | End: 2022-06-21

## 2022-06-21 RX ORDER — IBUPROFEN 200 MG
16-32 TABLET ORAL AS NEEDED
Status: DISCONTINUED | OUTPATIENT
Start: 2022-06-21 | End: 2022-06-22 | Stop reason: HOSPADM

## 2022-06-21 RX ORDER — SODIUM CHLORIDE, SODIUM GLUCONATE, SODIUM ACETATE, POTASSIUM CHLORIDE AND MAGNESIUM CHLORIDE 30; 37; 368; 526; 502 MG/100ML; MG/100ML; MG/100ML; MG/100ML; MG/100ML
INJECTION, SOLUTION INTRAVENOUS CONTINUOUS PRN
Status: DISCONTINUED | OUTPATIENT
Start: 2022-06-21 | End: 2022-06-21 | Stop reason: SURG

## 2022-06-21 RX ORDER — FENTANYL CITRATE 50 UG/ML
50 INJECTION, SOLUTION INTRAMUSCULAR; INTRAVENOUS
Status: DISCONTINUED | OUTPATIENT
Start: 2022-06-21 | End: 2022-06-21 | Stop reason: HOSPADM

## 2022-06-21 RX ORDER — HYDROMORPHONE HYDROCHLORIDE 1 MG/ML
0.5 INJECTION, SOLUTION INTRAMUSCULAR; INTRAVENOUS; SUBCUTANEOUS
Status: DISCONTINUED | OUTPATIENT
Start: 2022-06-21 | End: 2022-06-21 | Stop reason: HOSPADM

## 2022-06-21 RX ORDER — OXYCODONE HYDROCHLORIDE 5 MG/1
5-10 TABLET ORAL EVERY 4 HOURS PRN
Status: DISCONTINUED | OUTPATIENT
Start: 2022-06-21 | End: 2022-06-21

## 2022-06-21 RX ORDER — KETOROLAC TROMETHAMINE 30 MG/ML
INJECTION, SOLUTION INTRAMUSCULAR; INTRAVENOUS AS NEEDED
Status: DISCONTINUED | OUTPATIENT
Start: 2022-06-21 | End: 2022-06-21 | Stop reason: SURG

## 2022-06-21 RX ORDER — KETOROLAC TROMETHAMINE 15 MG/ML
15 INJECTION, SOLUTION INTRAMUSCULAR; INTRAVENOUS EVERY 6 HOURS
Status: DISCONTINUED | OUTPATIENT
Start: 2022-06-22 | End: 2022-06-22 | Stop reason: HOSPADM

## 2022-06-21 RX ORDER — ACETAMINOPHEN 325 MG/1
650 TABLET ORAL EVERY 6 HOURS PRN
Status: DISCONTINUED | OUTPATIENT
Start: 2022-06-21 | End: 2022-06-22 | Stop reason: HOSPADM

## 2022-06-21 RX ORDER — SODIUM CHLORIDE 9 MG/ML
INJECTION, SOLUTION INTRAVENOUS CONTINUOUS
Status: ACTIVE | OUTPATIENT
Start: 2022-06-21 | End: 2022-06-22

## 2022-06-21 RX ORDER — CLINDAMYCIN PHOSPHATE 600 MG/50ML
600 INJECTION, SOLUTION INTRAVENOUS
Status: COMPLETED | OUTPATIENT
Start: 2022-06-21 | End: 2022-06-21

## 2022-06-21 RX ORDER — HYDROMORPHONE HYDROCHLORIDE 1 MG/ML
INJECTION, SOLUTION INTRAMUSCULAR; INTRAVENOUS; SUBCUTANEOUS AS NEEDED
Status: DISCONTINUED | OUTPATIENT
Start: 2022-06-21 | End: 2022-06-21 | Stop reason: SURG

## 2022-06-21 RX ORDER — HYDROMORPHONE HYDROCHLORIDE 1 MG/ML
0.25 INJECTION, SOLUTION INTRAMUSCULAR; INTRAVENOUS; SUBCUTANEOUS EVERY 4 HOURS PRN
Status: DISCONTINUED | OUTPATIENT
Start: 2022-06-21 | End: 2022-06-21

## 2022-06-21 RX ORDER — EPHEDRINE SULFATE 50 MG/ML
INJECTION, SOLUTION INTRAVENOUS AS NEEDED
Status: DISCONTINUED | OUTPATIENT
Start: 2022-06-21 | End: 2022-06-21 | Stop reason: SURG

## 2022-06-21 RX ORDER — SODIUM CHLORIDE 9 MG/ML
INJECTION, SOLUTION INTRAVENOUS CONTINUOUS
Status: DISCONTINUED | OUTPATIENT
Start: 2022-06-21 | End: 2022-06-22

## 2022-06-21 RX ORDER — DEXAMETHASONE SODIUM PHOSPHATE 4 MG/ML
INJECTION, SOLUTION INTRA-ARTICULAR; INTRALESIONAL; INTRAMUSCULAR; INTRAVENOUS; SOFT TISSUE AS NEEDED
Status: DISCONTINUED | OUTPATIENT
Start: 2022-06-21 | End: 2022-06-21 | Stop reason: SURG

## 2022-06-21 RX ORDER — FAMOTIDINE 10 MG/ML
INJECTION INTRAVENOUS AS NEEDED
Status: DISCONTINUED | OUTPATIENT
Start: 2022-06-21 | End: 2022-06-21 | Stop reason: SURG

## 2022-06-21 RX ORDER — OXYCODONE HYDROCHLORIDE 5 MG/1
5 TABLET ORAL EVERY 4 HOURS PRN
Status: DISCONTINUED | OUTPATIENT
Start: 2022-06-21 | End: 2022-06-21

## 2022-06-21 RX ORDER — BUPIVACAINE HYDROCHLORIDE 5 MG/ML
INJECTION, SOLUTION EPIDURAL; INTRACAUDAL
Status: DISCONTINUED | OUTPATIENT
Start: 2022-06-21 | End: 2022-06-21 | Stop reason: HOSPADM

## 2022-06-21 RX ORDER — ONDANSETRON HYDROCHLORIDE 2 MG/ML
4 INJECTION, SOLUTION INTRAVENOUS EVERY 8 HOURS PRN
Status: DISCONTINUED | OUTPATIENT
Start: 2022-06-21 | End: 2022-06-22

## 2022-06-21 RX ORDER — POLYETHYLENE GLYCOL 3350 17 G/17G
17 POWDER, FOR SOLUTION ORAL DAILY
Status: DISCONTINUED | OUTPATIENT
Start: 2022-06-21 | End: 2022-06-22 | Stop reason: HOSPADM

## 2022-06-21 RX ORDER — CEFAZOLIN SODIUM/WATER 2 G/20 ML
2 SYRINGE (ML) INTRAVENOUS ONCE
Status: COMPLETED | OUTPATIENT
Start: 2022-06-21 | End: 2022-06-21

## 2022-06-21 RX ORDER — BUPIVACAINE HYDROCHLORIDE 7.5 MG/ML
INJECTION, SOLUTION INTRASPINAL
Status: COMPLETED | OUTPATIENT
Start: 2022-06-21 | End: 2022-06-21

## 2022-06-21 RX ORDER — POLYETHYLENE GLYCOL 3350 17 G/17G
17 POWDER, FOR SOLUTION ORAL DAILY
Status: DISCONTINUED | OUTPATIENT
Start: 2022-06-22 | End: 2022-06-22 | Stop reason: HOSPADM

## 2022-06-21 RX ORDER — ONDANSETRON HYDROCHLORIDE 2 MG/ML
4 INJECTION, SOLUTION INTRAVENOUS
Status: DISCONTINUED | OUTPATIENT
Start: 2022-06-21 | End: 2022-06-21 | Stop reason: HOSPADM

## 2022-06-21 RX ORDER — ACETAMINOPHEN 325 MG/1
650 TABLET ORAL EVERY 6 HOURS
Status: DISCONTINUED | OUTPATIENT
Start: 2022-06-21 | End: 2022-06-21

## 2022-06-21 RX ORDER — ONDANSETRON HYDROCHLORIDE 2 MG/ML
INJECTION, SOLUTION INTRAVENOUS AS NEEDED
Status: DISCONTINUED | OUTPATIENT
Start: 2022-06-21 | End: 2022-06-21 | Stop reason: SURG

## 2022-06-21 RX ORDER — DEXTROSE 50 % IN WATER (D50W) INTRAVENOUS SYRINGE
25 AS NEEDED
Status: DISCONTINUED | OUTPATIENT
Start: 2022-06-21 | End: 2022-06-22 | Stop reason: HOSPADM

## 2022-06-21 RX ORDER — PROCHLORPERAZINE EDISYLATE 5 MG/ML
10 INJECTION INTRAMUSCULAR; INTRAVENOUS EVERY 8 HOURS PRN
Status: DISCONTINUED | OUTPATIENT
Start: 2022-06-21 | End: 2022-06-22

## 2022-06-21 RX ORDER — ACETAMINOPHEN 325 MG/1
650 TABLET ORAL EVERY 6 HOURS PRN
Status: DISCONTINUED | OUTPATIENT
Start: 2022-06-21 | End: 2022-06-21

## 2022-06-21 RX ADMIN — EPHEDRINE SULFATE 10 MG: 50 INJECTION, SOLUTION INTRAVENOUS at 12:41

## 2022-06-21 RX ADMIN — BUPIVACAINE HYDROCHLORIDE IN DEXTROSE 1.6 ML: 7.5 INJECTION, SOLUTION SUBARACHNOID at 11:15

## 2022-06-21 RX ADMIN — EPHEDRINE SULFATE 10 MG: 50 INJECTION, SOLUTION INTRAVENOUS at 11:40

## 2022-06-21 RX ADMIN — HYDROMORPHONE HYDROCHLORIDE 0.5 MG: 1 INJECTION, SOLUTION INTRAMUSCULAR; INTRAVENOUS; SUBCUTANEOUS at 14:20

## 2022-06-21 RX ADMIN — ACETAMINOPHEN 650 MG: 325 TABLET ORAL at 18:53

## 2022-06-21 RX ADMIN — CLINDAMYCIN IN 5 PERCENT DEXTROSE 600 MG: 12 INJECTION, SOLUTION INTRAVENOUS at 23:26

## 2022-06-21 RX ADMIN — KETOROLAC TROMETHAMINE 30 MG: 30 INJECTION, SOLUTION INTRAMUSCULAR; INTRAVENOUS at 13:45

## 2022-06-21 RX ADMIN — ONDANSETRON HYDROCHLORIDE 4 MG: 2 SOLUTION INTRAMUSCULAR; INTRAVENOUS at 17:11

## 2022-06-21 RX ADMIN — PROPOFOL 50 MCG/KG/MIN: 10 INJECTION, EMULSION INTRAVENOUS at 11:16

## 2022-06-21 RX ADMIN — CEFAZOLIN 2 G: 2 INJECTION, POWDER, FOR SOLUTION INTRAMUSCULAR; INTRAVENOUS at 19:07

## 2022-06-21 RX ADMIN — FENTANYL CITRATE 50 MCG: 50 INJECTION, SOLUTION INTRAMUSCULAR; INTRAVENOUS at 14:41

## 2022-06-21 RX ADMIN — Medication 50 MCG: at 14:07

## 2022-06-21 RX ADMIN — FAMOTIDINE 20 MG: 10 INJECTION, SOLUTION INTRAVENOUS at 13:48

## 2022-06-21 RX ADMIN — EPHEDRINE SULFATE 10 MG: 50 INJECTION, SOLUTION INTRAVENOUS at 13:06

## 2022-06-21 RX ADMIN — SODIUM CHLORIDE 90 ML/HR: 9 INJECTION, SOLUTION INTRAVENOUS at 14:43

## 2022-06-21 RX ADMIN — ONDANSETRON 4 MG: 2 INJECTION INTRAMUSCULAR; INTRAVENOUS at 11:16

## 2022-06-21 RX ADMIN — CLINDAMYCIN IN 5 PERCENT DEXTROSE 600 MG: 12 INJECTION, SOLUTION INTRAVENOUS at 15:02

## 2022-06-21 RX ADMIN — TRANEXAMIC ACID 1400 MG: 100 INJECTION, SOLUTION INTRAVENOUS at 11:07

## 2022-06-21 RX ADMIN — SODIUM CHLORIDE 1000 MG: 9 INJECTION, SOLUTION INTRAVENOUS at 15:02

## 2022-06-21 RX ADMIN — SODIUM CHLORIDE: 9 INJECTION, SOLUTION INTRAVENOUS at 10:30

## 2022-06-21 RX ADMIN — MIDAZOLAM HYDROCHLORIDE 2 MG: 1 INJECTION, SOLUTION INTRAMUSCULAR; INTRAVENOUS at 11:09

## 2022-06-21 RX ADMIN — Medication 2 G: at 11:19

## 2022-06-21 RX ADMIN — OXYCODONE HYDROCHLORIDE 5 MG: 5 TABLET ORAL at 16:57

## 2022-06-21 RX ADMIN — SODIUM CHLORIDE, SODIUM GLUCONATE, SODIUM ACETATE, POTASSIUM CHLORIDE AND MAGNESIUM CHLORIDE: 526; 502; 368; 37; 30 INJECTION, SOLUTION INTRAVENOUS at 14:02

## 2022-06-21 RX ADMIN — Medication 50 MCG: at 13:22

## 2022-06-21 RX ADMIN — DEXAMETHASONE SODIUM PHOSPHATE 4 MG: 4 INJECTION, SOLUTION INTRA-ARTICULAR; INTRALESIONAL; INTRAMUSCULAR; INTRAVENOUS; SOFT TISSUE at 11:11

## 2022-06-21 RX ADMIN — HYDROMORPHONE HYDROCHLORIDE 0.5 MG: 1 INJECTION, SOLUTION INTRAMUSCULAR; INTRAVENOUS; SUBCUTANEOUS at 14:16

## 2022-06-21 RX ADMIN — KETOROLAC TROMETHAMINE 15 MG: 15 INJECTION, SOLUTION INTRAMUSCULAR; INTRAVENOUS at 23:22

## 2022-06-21 RX ADMIN — Medication 50 MCG: at 13:44

## 2022-06-21 RX ADMIN — PROCHLORPERAZINE EDISYLATE 10 MG: 5 INJECTION, SOLUTION INTRAMUSCULAR; INTRAVENOUS at 23:38

## 2022-06-21 RX ADMIN — Medication 50 MCG: at 11:12

## 2022-06-21 RX ADMIN — FENTANYL CITRATE 50 MCG: 50 INJECTION, SOLUTION INTRAMUSCULAR; INTRAVENOUS at 14:25

## 2022-06-21 ASSESSMENT — COGNITIVE AND FUNCTIONAL STATUS - GENERAL
AFFECT: FLAT/BLUNTED AFFECT
CLIMB 3 TO 5 STEPS WITH RAILING: 2 - A LOT
STANDING UP FROM CHAIR USING ARMS: 3 - A LITTLE
MOVING TO AND FROM BED TO CHAIR: 3 - A LITTLE
WALKING IN HOSPITAL ROOM: 3 - A LITTLE

## 2022-06-21 ASSESSMENT — ENCOUNTER SYMPTOMS: DYSRHYTHMIAS: 1

## 2022-06-21 NOTE — OP NOTE
Pre-op diagnosis: Inflammatory arthritis left knee    Post op diagnosis: Inflammatory arthritis left knee    Operative procedure: Left total knee replacement and debridement arthrofibrosis    Surgeon: Gregg Villasenor MD    Anesthesia: Spinal    Tourniquet time: 73 minutes    EBL:      Specimen:      Left knee was sterilely prepped and draped in the usual fashion.  The limb was exsanguinated and tourniquet was inflated.  Incision was made anteriorly.  Was taken down through the subcutaneous tissues.  Medial retinacular approach was made to the VMO.  The VMO was split.  Significant pannus and fibrosis was debrided.  There was also a granulaoma in the superolateral pouch which was rtemoved and sent to pathology.  Patella thickness was measured.  The patella was then planed down to a flat surface.  The patella was protected with a button.  The fat pad and ACL were excised.  The menisci were excised.  The knee was flexed and a drill hole was placed in the distal femur.  Intramedullary guide was used for distal cut of the femur.  This was in 5° of valgus resecting 10 mm of bone.  The femur was Biomet Vanguard  sized at 60.  The appropriate anterior posterior and chamfer cuts were made.  The tibia was prepared with the extra medullary guide.  Bone block was prepared to protect the PCL.  Minimal resection of the proximal tibia was made.  The keying off the worn medial plateau.  The trial components fit nicely.  This was a 60 cruciate retaining femoral component.  Tibial component was 71 mm.  The tibial insert was AS 10 mm.  The patella was 31 mm.  Drill holes were made for the patella.  The patella tracked well with no lateral release.  Drill holes were made for the femoral component.  Cruciate stem was prepared for the tibial component.  Real components were brought on the table.  After complete excision of the granuloma and fibrosis, the knee was irrigated with Irricept and then irrigated copiously.  Tibia and femur  were pulsatile lavaged.  Sclerotic bone the proximal tibia was prepared with a multi-punch for bone cement penetration.  Cement was mixed.  The tibia was cemented in place.  Excess cement was removed.  The tibial insert was fixed to the tibial tray.  The femur was press fit onto the femur.  Patella was cemented in place. Excess cement was removed.  The pericapsular tissues were injected with Marcaine and Duramorph while the cement hardened.  The tourniquet was deflated.  Hemotasis was obtained with electrocautery.  Knee was copiously irrigated.  Capsule was approximated with #1 Vicryl.  2-0 Vicryl was used for closing the fascia of vastus medialis.  The subcutaneous tissues were approximated with 2-0 Vicryl.  3-0 Vicryl was used for the dermis.  The dermabond was used for the skin.  A compressive dressing was applied.  Patient tolerated the procedure well.

## 2022-06-21 NOTE — HOSPITAL COURSE
Silke is a 66 y.o. female admitted on 6/21/2022 with Osteoarthritis of left knee, unspecified osteoarthritis type [M17.12]. Principal problem is Osteoarthritis of left knee, unspecified osteoarthritis type.    Past Medical History  Silke has a past medical history of Environmental and seasonal allergies, H/o Lyme disease, OA (osteoarthritis), and Skin cancer.    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 WBAT.

## 2022-06-21 NOTE — PLAN OF CARE
Problem: Adult Inpatient Plan of Care  Goal: Plan of Care Review  Outcome: Progressing  Flowsheets (Taken 6/21/2022 2135)  Progress: improving  Plan of Care Reviewed With: patient  Outcome Summary: Pt seen POD 0 for PT initial evaluation. Pt currently MINAX1 for supine to sit, MINAX1 for transfers from EOB, and MINAX1 for ambulation with AD 25'x1 limited by pain and significant inc nausea (needing barf bag sitting EOB). Due to pt balance and strength deficits pt below baseline indpendent without AD benefitting from continued skilled PT to maxmize fxn prior to d/c. Reccomend home with assist and home PT when medically stable.  Goal: Patient-Specific Goal (Individualized)  Outcome: Progressing     Problem: Joint Function Impaired (Knee Arthroplasty)  Goal: Optimal Functional Ability  Outcome: Progressing

## 2022-06-21 NOTE — OR SURGEON
Pre-Procedure patient identification:  I am the primary operating surgeon/proceduralist and I have identified the patient and confirmed laterality is left on 06/21/22 at 10:06 AM Gregg Villasenor MD  Phone Number: [phone]

## 2022-06-21 NOTE — ANESTHESIA PREPROCEDURE EVALUATION
Relevant Problems   No relevant active problems       Anesthesia ROS/MED HX    Anesthesia History - neg  Pulmonary - neg  Neuro/Psych - neg  Cardiovascular  Dysrhythmias   Covid19 Test Reviewed and ECG reviewed   Normal ECG  Comments: Medical clearance noted.  Hematological - neg  GI/Hepatic   GERD    Control: well controlled  Musculoskeletal- neg  Renal Disease- neg  Endo/Other   Infectious disease  Body Habitus: Normal       Past Surgical History:   Procedure Laterality Date   • KNEE ARTHROSCOPY Left    • TENDON REPAIR      finger   • VEIN LIGATION AND STRIPPING Right        Physical Exam    Airway   Mallampati: III   TM distance: >3 FB   Neck ROM: full  Other Findings   Medical clearance noted.        Anesthesia Plan    Plan: spinal   ASA 2

## 2022-06-21 NOTE — ANESTHESIOLOGIST PRE-PROCEDURE ATTESTATION
Pre-Procedure Patient Identification:  I am the Primary Anesthesiologist and have identified the patient on 06/21/22 at 10:19 AM.   I have confirmed the procedure(s) will be performed by the following surgeon/proceduralist Gregg Villasenor MD.

## 2022-06-21 NOTE — PROGRESS NOTES
Patient: Silke Ashraf  Location:  [facility] Operating Room OR  MRN:  [id number]  Today's date:  6/21/2022     Pt left supine in bed and resting comfortably with all lines arranged, incontinence pad underneath, SCD's in place, call bell and personal items within reach and bed alarm activated. Nurse aware of pt's performance and positioning.       Silke is a 66 y.o. female admitted on 6/21/2022 with Osteoarthritis of left knee, unspecified osteoarthritis type [M17.12]. Principal problem is Osteoarthritis of left knee, unspecified osteoarthritis type.    Past Medical History  Silke has a past medical history of Environmental and seasonal allergies, H/o Lyme disease, OA (osteoarthritis), and Skin cancer.    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 WBAT.      PT Vitals    Date/Time Pulse Resp SpO2 O2 Flow Rate BP MAP BP Location BP Method Pt Position [facility]   06/21/22 1715 88 21 94 % -- 124/63 88 mmHg -- -- --    06/21/22 1715 -- -- -- -- -- -- Right upper arm Automatic Lying [facility]   06/21/22 1725 86 -- 90 % 1 L/min 126/61 -- Right upper arm Automatic Lying [facility]   06/21/22 1730 88 25 97 % -- 126/61 87 mmHg -- -- -- MJ      PT Pain    Date/Time Pain Type Rating: Rest Rating: Activity Interventions [facility]   06/21/22 1715 Pain Assessment 2 4 position adjusted;diversional activity provided [facility]   06/21/22 1725 Pain Assessment 2 4 position adjusted;diversional activity provided [facility]          Prior Living Environment    Flowsheet Row Most Recent Value   Current Living Arrangements home   Living Environment Comment pt reports living in 2 story house with spouse with 3 YLUBOV with full flight to second floor to reach bed/bath.        Prior Level of Function    Flowsheet Row Most Recent Value   Dominant Hand right   Ambulation assistive equipment   Transferring assistive equipment   Toileting independent   Bathing independent   Dressing independent   Prior Level of Function Comment pt  "reports using SPC/RW baseline for ambulation and independent for ADLS,  + , + psychologist   Assistive Device Currently Used at Home cane, straight           PT Evaluation and Treatment - 06/21/22 1710        PT Time Calculation    Start Time 1710     Stop Time 1730     Time Calculation (min) 20 min        Session Details    Document Type initial evaluation     Mode of Treatment physical therapy        General Information    Patient Profile Reviewed yes     Patient/Family/Caregiver Comments/Observations Rn cleared for PT     General Observations of Patient Pt rec'd supine in bed c/o inc nausea however agreable to PT session \"lets get this over with\"     Existing Precautions/Restrictions hip;weight bearing        Weight-bearing Status    Left LE Weight-Bearing Status weight-bearing as tolerated (WBAT)        Cognition/Psychosocial    Affect/Mental Status (Cognition) flat/blunted affect     Orientation Status (Cognition) oriented x 4     Follows Commands (Cognition) WFL        Sensory Assessment (Somatosensory)    Left LE Sensory Assessment light touch awareness;intact     Right LE Sensory Assessment light touch awareness;intact        Range of Motion (ROM)    Left Lower Extremity (ROM) left LE ROM is WFL;knee     Knee, Left (ROM) -8-62     Right Lower Extremity (ROM) right LE ROM is WFL        Strength (Manual Muscle Testing)    Hip, Left (Strength) 3/5     Knee, Left (Strength) NT pain     Ankle, Left (Strength) 3/5     Hip, Right (Strength) 4/5     Knee, Right (Strength) 4/5     Ankle, Right (Strength) 4/5        Strength Comprehensive (MMT)    Comment assessed EOB        Bed Mobility    [place], Supine to Sit minimum assist (75% or more patient effort);1 person assist     [place], Sit to Supine minimum assist (75% or more patient effort);1 person assist     Comment (Bed Mobility) OOB to R inc assist with negotiation of L LE        Sit to Stand Transfer    [place], Sit to Stand Transfer minimum " assist (75% or more patient effort);1 person assist     Verbal Cues hand placement;technique;safety     Assistive Device walker, front-wheeled     Comment x2 trials initally modA first trial progressing to Bee inc assist for leverage to stand        Stand to Sit Transfer    [place], Stand to Sit Transfer minimum assist (75% or more patient effort);1 person assist     Verbal Cues hand placement;technique;safety     Assistive Device walker, front-wheeled     Comment to EOB x2        Gait Training    [place], Gait minimum assist (75% or more patient effort);1 person assist     Assistive Device walker, front-wheeled     Distance in Feet 25 feet     Pattern (Gait) step-to     Deviations/Abnormal Patterns (Gait) gait speed decreased;step length decreased;kareen decreased;antalgic;base of support, narrow     Maintains Weight-bearing Status (Gait) able to maintain     Comment (Gait/Stairs) 25'x1 head down unable to lift due to inc nauea; deferred further mobility due to inc nausea and fatigue        Safety Issues, Functional Mobility    Safety Issues Affecting Function (Mobility) insight into deficits/self-awareness;awareness of need for assistance;positioning of assistive device;safety precaution awareness     Impairments Affecting Function (Mobility) balance;endurance/activity tolerance;strength;range of motion (ROM);pain     Comment, Safety Issues/Impairments (Mobility) nausea limiting session        Balance    Static Sitting Balance WFL     Dynamic Sitting Balance mild impairment     Static Standing Balance mild impairment     Dynamic Standing Balance mild impairment     Comment, Balance with rW        Motor Skills    Functional Endurance fair        AM-PAC (TM) - Mobility (Current Function)    Turning from your back to your side while in a flat bed without using bedrails? 3 - A Little     Moving from lying on your back to sitting on the side of a flat bed without using bedrails? 3 - A Little     Moving to  and from a bed to a chair? 3 - A Little     Standing up from a chair using your arms? 3 - A Little     To walk in a hospital room? 3 - A Little     Climbing 3-5 steps with a railing? 2 - A Lot     AM-PAC (TM) Mobility Score 17        Assessment/Plan (PT)    Daily Outcome Statement Pt seen POD 0 for PT initial evaluation. Pt currently MINAX1 for supine to sit, MINAX1 for transfers from EOB, and MINAX1 for ambulation with AD 25'x1 limited by pain and significant inc nausea (needing barf bag sitting EOB). Due to pt balance and strength deficits pt below baseline indpendent without AD benefitting from continued skilled PT to maxmize fxn prior to d/c. Reccomend home with assist and home PT when medically stable.       Rehab Potential good, to achieve stated therapy goals     Therapy Frequency 5 times/wk     Planned Therapy Interventions balance training;bed mobility training;gait training;strengthening;stair training;transfer training               PT Assessment/Plan    Flowsheet Row Most Recent Value   PT Recommended Discharge Disposition home with home health, home with assistance at 06/21/2022 1710   Anticipated Equipment Needs at Discharge (PT) none at 06/21/2022 1710   Patient/Family Therapy Goals Statement to go home at 06/21/2022 1710                    Education Documentation  Activity, taught by Deborah España PT at 6/21/2022  6:54 PM.  Learner: Patient  Readiness: Acceptance  Method: Explanation  Response: Verbalizes Understanding  Comment: PT POC/goals, safe fxl mboiltiy, proper use of AD, wBAT          PT Goals    Flowsheet Row Most Recent Value   Bed Mobility Goal 1    Activity/Assistive Device bed mobility activities, all at 06/21/2022 1710   [place] modified independence at 06/21/2022 1710   Time Frame by discharge at 06/21/2022 1710   Progress/Outcome goal ongoing at 06/21/2022 1710   Transfer Goal 1    Activity/Assistive Device all transfers, walker, front-wheeled at 06/21/2022 1710    [place] modified independence at 06/21/2022 1710   Time Frame by discharge at 06/21/2022 1710   Progress/Outcome goal ongoing at 06/21/2022 1710   Gait Training Goal 1    Activity/Assistive Device gait (walking locomotion), assistive device use at 06/21/2022 1710   [place] modified independence at 06/21/2022 1710   Distance 150 at 06/21/2022 1710   Time Frame by discharge at 06/21/2022 1710   Progress/Outcome goal ongoing at 06/21/2022 1710   Stairs Goal 1    Activity/Assistive Device stairs, all skills, assistive device use at 06/21/2022 1710   [place] supervision required at 06/21/2022 1710   Number of Stairs 12 at 06/21/2022 1710   Time Frame by discharge at 06/21/2022 1710   Progress/Outcome goal ongoing at 06/21/2022 1710

## 2022-06-21 NOTE — ANESTHESIA PROCEDURE NOTES
Spinal Block    Patient location during procedure: OR  Start time: 6/21/2022 11:11 AM  End time: 6/21/2022 11:15 AM  Staffing  Performed: anesthesiologist   Anesthesiologist: Gregg Kohler MD  Reason for block: primary anesthetic  Preanesthetic Checklist  Completed: patient identified, site marked, surgical consent, pre-op evaluation, timeout performed, IV checked, risks and benefits discussed, monitors and equipment checked and sterile field maintained during procedure  Spinal Block  Patient position: sitting  Prep: Betadine and site prepped and draped  Patient monitoring: heart rate, cardiac monitor, continuous pulse ox and blood pressure  Approach: midline  Location: L3-4  Needle  Needle type: pencil-tip   Needle gauge: 25 G  Needle length: 10 cm  Needle insertion depth: 6 cm  Assessment  Sensory level: T4  Medications Administered - 6/21/2022 11:15 AM   bupivacaine PF (MARCAINE SPINAL) 0.75% intrathecal injection - intrathecal   1.6 mL - 6/21/2022 11:15:00 AM

## 2022-06-22 VITALS
SYSTOLIC BLOOD PRESSURE: 115 MMHG | DIASTOLIC BLOOD PRESSURE: 58 MMHG | RESPIRATION RATE: 18 BRPM | OXYGEN SATURATION: 97 % | HEART RATE: 75 BPM | TEMPERATURE: 97.8 F | BODY MASS INDEX: 24.8 KG/M2 | WEIGHT: 158 LBS | HEIGHT: 67 IN

## 2022-06-22 PROBLEM — A69.20 LYME DISEASE: Status: ACTIVE | Noted: 2022-06-22

## 2022-06-22 LAB
ANION GAP SERPL CALC-SCNC: 4 MEQ/L (ref 3–15)
BUN SERPL-MCNC: 8 MG/DL (ref 8–20)
CALCIUM SERPL-MCNC: 8.4 MG/DL (ref 8.9–10.3)
CHLORIDE SERPL-SCNC: 104 MEQ/L (ref 98–109)
CO2 SERPL-SCNC: 26 MEQ/L (ref 22–32)
CREAT SERPL-MCNC: 0.3 MG/DL (ref 0.6–1.1)
ERYTHROCYTE [DISTWIDTH] IN BLOOD BY AUTOMATED COUNT: 17.2 % (ref 11.7–14.4)
FUNGUS STAIN: NORMAL
GFR SERPL CREATININE-BSD FRML MDRD: >60 ML/MIN/1.73M*2
GLUCOSE SERPL-MCNC: 120 MG/DL (ref 70–99)
HCT VFR BLDCO AUTO: 30.3 % (ref 35–45)
HGB BLD-MCNC: 9.6 G/DL (ref 11.8–15.7)
MCH RBC QN AUTO: 25.9 PG (ref 28–33.2)
MCHC RBC AUTO-ENTMCNC: 31.7 G/DL (ref 32.2–35.5)
MCV RBC AUTO: 81.7 FL (ref 83–98)
PDW BLD AUTO: 8.2 FL (ref 9.4–12.3)
PLATELET # BLD AUTO: 310 K/UL (ref 150–369)
POTASSIUM SERPL-SCNC: 4.2 MEQ/L (ref 3.6–5.1)
RBC # BLD AUTO: 3.71 M/UL (ref 3.93–5.22)
RHODAMINE-AURAMINE STN SPEC: NORMAL
SODIUM SERPL-SCNC: 134 MEQ/L (ref 136–144)
WBC # BLD AUTO: 9.11 K/UL (ref 3.8–10.5)

## 2022-06-22 PROCEDURE — 85027 COMPLETE CBC AUTOMATED: CPT | Performed by: ORTHOPAEDIC SURGERY

## 2022-06-22 PROCEDURE — 97530 THERAPEUTIC ACTIVITIES: CPT | Mod: GP,CQ

## 2022-06-22 PROCEDURE — 25800000 HC PHARMACY IV SOLUTIONS: Performed by: ORTHOPAEDIC SURGERY

## 2022-06-22 PROCEDURE — 80048 BASIC METABOLIC PNL TOTAL CA: CPT | Mod: 59 | Performed by: ORTHOPAEDIC SURGERY

## 2022-06-22 PROCEDURE — 25800000 HC PHARMACY IV SOLUTIONS: Performed by: NURSE PRACTITIONER

## 2022-06-22 PROCEDURE — 36415 COLL VENOUS BLD VENIPUNCTURE: CPT | Performed by: ORTHOPAEDIC SURGERY

## 2022-06-22 PROCEDURE — 97166 OT EVAL MOD COMPLEX 45 MIN: CPT | Mod: GO

## 2022-06-22 PROCEDURE — 63600000 HC DRUGS/DETAIL CODE: Performed by: ORTHOPAEDIC SURGERY

## 2022-06-22 PROCEDURE — 99221 1ST HOSP IP/OBS SF/LOW 40: CPT | Performed by: HOSPITALIST

## 2022-06-22 PROCEDURE — 63600000 HC DRUGS/DETAIL CODE: Performed by: NURSE PRACTITIONER

## 2022-06-22 RX ORDER — POLYETHYLENE GLYCOL 3350 17 G/17G
17 POWDER, FOR SOLUTION ORAL DAILY
Qty: 3 PACKET | Refills: 0 | COMMUNITY
Start: 2022-06-22 | End: 2022-11-02 | Stop reason: ENTERED-IN-ERROR

## 2022-06-22 RX ORDER — ACETAMINOPHEN 325 MG/1
650 TABLET ORAL EVERY 6 HOURS PRN
COMMUNITY
Start: 2022-06-22 | End: 2022-07-22

## 2022-06-22 RX ORDER — NAPROXEN SODIUM 220 MG/1
81 TABLET, FILM COATED ORAL 2 TIMES DAILY
Qty: 60 TABLET | Refills: 0 | COMMUNITY
Start: 2022-06-22 | End: 2022-11-02 | Stop reason: ENTERED-IN-ERROR

## 2022-06-22 RX ORDER — OXYCODONE HYDROCHLORIDE 5 MG/1
5-10 TABLET ORAL EVERY 4 HOURS PRN
Qty: 30 TABLET | Refills: 0 | Status: SHIPPED | OUTPATIENT
Start: 2022-06-22 | End: 2022-06-27

## 2022-06-22 RX ADMIN — SODIUM CHLORIDE: 9 INJECTION, SOLUTION INTRAVENOUS at 11:17

## 2022-06-22 RX ADMIN — SODIUM CHLORIDE: 9 INJECTION, SOLUTION INTRAVENOUS at 03:55

## 2022-06-22 RX ADMIN — KETOROLAC TROMETHAMINE 15 MG: 15 INJECTION, SOLUTION INTRAMUSCULAR; INTRAVENOUS at 06:20

## 2022-06-22 RX ADMIN — CEFAZOLIN 2 G: 2 INJECTION, POWDER, FOR SOLUTION INTRAMUSCULAR; INTRAVENOUS at 03:56

## 2022-06-22 RX ADMIN — PROMETHAZINE HYDROCHLORIDE 6.25 MG: 25 INJECTION INTRAMUSCULAR; INTRAVENOUS at 11:18

## 2022-06-22 RX ADMIN — ONDANSETRON HYDROCHLORIDE 4 MG: 2 SOLUTION INTRAMUSCULAR; INTRAVENOUS at 03:56

## 2022-06-22 RX ADMIN — PROCHLORPERAZINE EDISYLATE 10 MG: 5 INJECTION, SOLUTION INTRAMUSCULAR; INTRAVENOUS at 09:17

## 2022-06-22 RX ADMIN — KETOROLAC TROMETHAMINE 15 MG: 15 INJECTION, SOLUTION INTRAMUSCULAR; INTRAVENOUS at 12:09

## 2022-06-22 ASSESSMENT — COGNITIVE AND FUNCTIONAL STATUS - GENERAL
AFFECT: FLAT/BLUNTED AFFECT
CLIMB 3 TO 5 STEPS WITH RAILING: 3 - A LITTLE
DRESSING REGULAR LOWER BODY CLOTHING: 3 - A LITTLE
MOVING TO AND FROM BED TO CHAIR: 3 - A LITTLE
DRESSING REGULAR UPPER BODY CLOTHING: 4 - NONE
TOILETING: 3 - A LITTLE
CLIMB 3 TO 5 STEPS WITH RAILING: 3 - A LITTLE
WALKING IN HOSPITAL ROOM: 3 - A LITTLE
MOVING TO AND FROM BED TO CHAIR: 3 - A LITTLE
WALKING IN HOSPITAL ROOM: 3 - A LITTLE
HELP NEEDED FOR BATHING: 3 - A LITTLE
STANDING UP FROM CHAIR USING ARMS: 3 - A LITTLE
HELP NEEDED FOR PERSONAL GROOMING: 3 - A LITTLE
EATING MEALS: 4 - NONE
STANDING UP FROM CHAIR USING ARMS: 3 - A LITTLE

## 2022-06-22 ASSESSMENT — PATIENT HEALTH QUESTIONNAIRE - PHQ9: SUM OF ALL RESPONSES TO PHQ9 QUESTIONS 1 & 2: 0

## 2022-06-22 NOTE — PATIENT CARE CONFERENCE
Care Progression Rounds Note  Date: 6/22/2022  Time: 10:19 AM     Patient Name: Silke Ashraf     Medical Record Number: [id number]   YOB: 1955  Sex: Female      Room/Bed: 4222    Admitting Diagnosis: Osteoarthritis of left knee, unspecified osteoarthritis type [M17.12]   Admit Date/Time: 6/21/2022  9:49 AM    Primary Diagnosis: Osteoarthritis of left knee, unspecified osteoarthritis type  Principal Problem: Osteoarthritis of left knee, unspecified osteoarthritis type    GMLOS: pending  Anticipated Discharge Date: 6/22/2022    AM-PAC:  Mobility Score: 17    Discharge Planning:  Current Living Arrangements: home  Anticipated Discharge Disposition: home with home health, home with assistance    Barriers to Discharge:  Medical issues not resolved    Comments:       Participants:  nursing, social work/services

## 2022-06-22 NOTE — ASSESSMENT & PLAN NOTE
S/p Left total knee replacement and debridement arthrofibrosis on 6/21/2022    Recommend incentive spirometry for prevention of atelectasis  Recommend DVT prophylaxis, the timing and agent determined by primary team   recommend stool softener to prevent constipation  Nicotine we will continue to follow

## 2022-06-22 NOTE — PLAN OF CARE
Problem: Adult Inpatient Plan of Care  Goal: Readiness for Transition of Care  Intervention: Mutually Develop Transition Plan  Flowsheets (Taken 6/22/2022 1154)  Anticipated Discharge Disposition:   home with assistance   home with home health  Discharge Coordination/Progress: SW met with patient and patient's  at bedside to assess for discharge needs.  Patient lives with her  in a two story home with three steps to enter and bedroom and bathroom on the second floor.  Patient has a SPC, rollator, and 3:1 commode at home.  She confirmed her PCP is Dr. Adhikari and she uses the [facility] in [place] without difficulty.  SW reviewed with pt/pt's  that home PT is recommended and SW offered patient a choice of HH providers.  Patient requested [facility] - SW notified [facility] liaison of referral.   is able to assist patient at discharge. PLAN: Patient to receive home PT through [facility] at discharge.  Assistive Device/Animal Currently Used at Home: (patient owns a rollator and has a 3:1 commode)   cane, straight   other (see comments)  Readmission Within the Last 30 Days: no previous admission in last 30 days  Patient/Family Anticipated Services at Transition: home health care  Patient/Family Anticipates Transition to:   home with family   home with help/services  Concerns to be Addressed: care coordination/care conferences  Patient's Choice of Community Agency(s): [facility]  Offered/Gave Vendor List: yes  Type of Home Care Services: home PT

## 2022-06-22 NOTE — PLAN OF CARE
Problem: Adult Inpatient Plan of Care  Goal: Plan of Care Review  Outcome: Progressing    Patient AAO x 4. Dressing to left knee clean, dry and intact. Patient OOB x 2 to the bathroom; voided twice. Patient reported that left knee pain was mild; however, complained of nausea throughout shift. One episode of emesis at 1930. PRN compazine and zofran given. Call bell in reach.

## 2022-06-22 NOTE — ANESTHESIA POSTPROCEDURE EVALUATION
Patient: Silke Ashraf    Procedure Summary     Date: 06/21/22 Room / Location:  OR 4 / [facility] OR    Anesthesia Start: 1109 Anesthesia Stop: 1426    Procedure: LEFT KNEE ARTHROPLASTY TOTAL (Left Knee) Diagnosis:       Osteoarthritis of left knee, unspecified osteoarthritis type      (Osteoarthritis Left Knee M17.12)    Surgeons: Gregg Villasenor MD Responsible Provider: Gregg Kohler MD    Anesthesia Type: spinal ASA Status: 2          Anesthesia Type: spinal  PACU Vitals  6/21/2022 1415 - 6/21/2022 1515      6/21/2022  1430 6/21/2022  1445 6/21/2022  1500 6/21/2022  1515    BP: 116/62 107/59 118/60 116/63    Temp: 36.3 °C (97.4 °F) -- -- --    Pulse: 80 74 78 70    Resp: 11 53 9 10    SpO2: 97 % 97 % 98 % 95 %            Anesthesia Post Evaluation    Pain management: adequate  Patient participation: complete - patient participated  Level of consciousness: awake and alert  Cardiovascular status: acceptable  Airway Patency: adequate  Respiratory status: acceptable  Hydration status: acceptable  Anesthetic complications: no

## 2022-06-22 NOTE — CONSULTS
Hospital Medicine Service -  History & Physical        CHIEF COMPLAINT   No chief complaint on file.    Requesting Physician: Gregg Villasenor MD    Reason for Consultation: Medical Management    HISTORY OF PRESENT ILLNESS      This is a 66 years old female with medical history chronic Lyme disease osteoarthritis, who presented to [facility] for elective left side total knee replacement, postoperation medical consult for medical management, patient was seen and examined postoperation day 1, patient reported few okay, a little nauseous from pain medications, otherwise denied fever chill cough denied chest pain back pain abdominal pain denied overt GI  bleeding, patient reported has been suffering chronic Lyme disease for many years in the past take a lot of antibiotics for that  I personally reviewed patient chart, discussed with patient, review imaging study, medical team will continue to follow patient while at hospital, if  patient being discharge, we recommend continued follow-up with primary care physician as outpatient to continue care    PAST MEDICAL AND SURGICAL HISTORY      Past Medical History:   Diagnosis Date   • Environmental and seasonal allergies    • H/o Lyme disease    • OA (osteoarthritis)     both knees   • Skin cancer     squamous cell     Past Surgical History:   Procedure Laterality Date   • KNEE ARTHROSCOPY Left    • TENDON REPAIR      finger   • VEIN LIGATION AND STRIPPING Right      PCP: Marilynn Adhikari MD  MEDICATIONS        Prior to Admission medications    Not on File       ALLERGIES        Sulfa (sulfonamide antibiotics)    FAMILY HISTORY        History reviewed. No pertinent family history.    SOCIAL HISTORY        Social History     Socioeconomic History   • Marital status:      Spouse name: None   • Number of children: None   • Years of education: None   • Highest education level: None   Tobacco Use   • Smoking status: Former Smoker   • Smokeless tobacco: Never Used  "  Substance and Sexual Activity   • Alcohol use: Never   • Drug use: Never       REVIEW OF SYSTEMS        All other systems reviewed and negative except as noted in HPI    PHYSICAL EXAMINATION        Temp:  [36.1 °C (97 °F)-36.5 °C (97.7 °F)] 36.4 °C (97.5 °F)  Heart Rate:  [64-88] 86  Resp:  [7-53] 16  BP: ()/(50-67) 102/52  Body mass index is 24.75 kg/m².  Visit Vitals  BP (!) 102/52 (BP Location: Left upper arm, Patient Position: Lying)   Pulse 86   Temp 36.4 °C (97.5 °F) (Oral)   Resp 16   Ht 1.702 m (5' 7\")   Wt 71.7 kg (158 lb)   SpO2 97%   BMI 24.75 kg/m²       General Appearance:  Awake, Alert, no distress     Head:    Normocephalic, without obvious abnormality, atraumatic   Neck: Supple      Lungs:   Bilateral equal expansion  No labored breathing     Heart:  S1 and S2 normal  no rub or gallop     Abdomen:   Soft  no masses  no organomegaly     Vascular: Bilateral radial pulse equally palpated      Extremities: no calf tenderness, left knee dressing intact dry ankle     Behavior/Emotional: Mood stable      Skin:                                     no rash     Neuro:                                 Spontaneous move bilateral upper and lower extremity                                                No focal deficits  LABS / IMAGING / EKG      Labs  CMP Results       06/22/22 06/13/22     0345 1650     135    K 4.2 4.8    Cl 104 101    CO2 26 25    Glucose 120 90    BUN 8 14    Creatinine 0.3 0.5    Calcium 8.4 9.1    Anion Gap 4 9    AST -- 18    ALT -- 9    Albumin -- 3.6    EGFR >60.0 >60.0        CBC Results       06/22/22 06/13/22     0345 1603    WBC 9.11 7.45    RBC 3.71 4.72    HGB 9.6 12.0    HCT 30.3 38.8    MCV 81.7 82.2    MCH 25.9 25.4    MCHC 31.7 30.9     418        Troponin I Results    No lab values to display.       PT/PTT Results       06/13/22     1603    PT 13.4    INR 1.0    PTT 34         Comment for INR at 1603 on 06/13/22: INR has no defined significance when PT is " within Reference Range.        Lab Results   Component Value Date    HGBA1C 5.5 06/13/2022     Imaging  X-RAY KNEE LEFT 1 OR 2 VIEWS    Result Date: 6/21/2022  IMPRESSION: Postsurgical changes of total knee arthroplasty as described.     ECG/Telemetry  Reviewed   ASSESSMENT AND PLAN           * Osteoarthritis of left knee, unspecified osteoarthritis type  Assessment & Plan  S/p Left total knee replacement and debridement arthrofibrosis on 6/21/2022    Recommend incentive spirometry for prevention of atelectasis  Recommend DVT prophylaxis, the timing and agent determined by primary team   recommend stool softener to prevent constipation  Nicotine we will continue to follow      Lyme disease  Assessment & Plan  Patient has been suffering chronic Lyme disease for many years  Continue follow-up with primary care physician for further monitoring management           VTE Assessment: [place]    VTE Prophylaxis Plan: Aspirin  Code Status: Full Code  Estimated Discharge Date: 6/22/2022  Disposition Planning: pending progression     This patient note has been dictated using speech recognition software. Inadvertent speech recognition errors should be disregarded. Please do not hesitate to call [facility] office for clarifications.     Shane Bauer MD  6/22/2022

## 2022-06-22 NOTE — PROGRESS NOTES
Patient:  Silke Ashraf  Location:  [address]  MRN:  [id number]  Today's date:  6/22/2022     General Observations  Start: Pt received supine in bed; she was agreeable to therapy and no issues or concerns identified by nurse prior to session   End: Pt supine in bed at end of session; call bell in reach, bed alarm activated, all needs met, personal items in reach and nurse notified of patient performance, patient's position, patient's response to activity and vitals       Silke is a 66 y.o. female admitted on 6/21/2022 with Osteoarthritis of left knee, unspecified osteoarthritis type [M17.12]. Principal problem is Osteoarthritis of left knee, unspecified osteoarthritis type.    Past Medical History  Silke has a past medical history of Environmental and seasonal allergies, H/o Lyme disease, OA (osteoarthritis), and Skin cancer.    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 WBAT.      OT Vitals    Date/Time Pulse SpO2 Pt Activity O2 Therapy BP BP Location BP Method Pt Position [facility]   06/22/22 1342 84 97 % At rest None (Room air) 99/52 Left upper arm Automatic Lying    06/22/22 1348 85 -- -- -- 98/50 Left upper arm Automatic Sitting    06/22/22 1350 87 -- -- -- 99/54 Left upper arm Automatic Standing    06/22/22 1400 86 -- -- -- 102/52 Left upper arm Automatic Lying KK      OT Pain    Date/Time Pain Type Side/Orientation Location Rating: Rest Rating: Activity Interventions [facility]   06/22/22 1342 Pain Assessment left knee 5 5 position adjusted    06/22/22 1400 Pain Assessment left knee 5 5 position adjusted KK          Prior Living Environment    Flowsheet Row Most Recent Value   Current Living Arrangements home   Living Environment Comment pt reports living in 2 story house with spouse with 3 LYUBOV with full flight to second floor to reach bed/bath.        Prior Level of Function    Flowsheet Row Most Recent Value   Dominant Hand right   Ambulation  assistive equipment   Transferring assistive equipment   Toileting independent   Bathing independent   Dressing independent   Prior Level of Function Comment pt reports using SPC/RW baseline for ambulation and independent for ADLS,  + , + psychologist   Assistive Device Currently Used at Home cane, straight, other (see comments)  [patient owns a rollator and has a 3:1 commode]        Occupational Profile    Flowsheet Row Most Recent Value   Reason for Services/Referral TKA   Successful Occupations IND ADLs   Occupational History/Life Experiences Psychologist   Environmental Supports and Barriers lives with spouse   Patient Goals home           OT Evaluation and Treatment - 06/22/22 1342        OT Time Calculation    Start Time 1342     Stop Time 1404     Time Calculation (min) 22 min        Session Details    Document Type initial evaluation     Mode of Treatment occupational therapy        General Information    Patient Profile Reviewed yes     Existing Precautions/Restrictions fall;weight bearing        Weight-bearing Status    Left LE Weight-Bearing Status weight-bearing as tolerated (WBAT)        Cognition/Psychosocial    Affect/Mental Status (Cognition) flat/blunted affect     Orientation Status (Cognition) oriented x 4     Follows Commands (Cognition) WFL     Cognitive Function attention deficit     Attention Deficit (Cognition) minimal deficit;concentration   reports feeling fatigued, denied nausea       Hearing Assessment    Hearing Status WFL        Vision Assessment/Intervention    Visual Impairment/Limitations WFL        Sensory Assessment (Somatosensory)    Sensory Assessment (Somatosensory) sensation intact;UE sensation intact        Range of Motion (ROM)    Range of Motion ROM is WFL;bilateral upper extremities        Strength (Manual Muscle Testing)    Strength (Manual Muscle Testing) strength is WFL;bilateral upper extremities        Bed Mobility    [place], Supine to Sit supervision      [place], Sit to Supine supervision     Assistive Device head of bed elevated     Comment (Bed Mobility) L        Chair to Bed Transfer    [place], Chair to Bed close supervision     Assistive Device walker, front-wheeled     Comment to R        Sit to Stand Transfer    [place], Sit to Stand Transfer close supervision     Verbal Cues hand placement     Assistive Device walker, front-wheeled     Comment bed, chair        Stand to Sit Transfer    [place], Stand to Sit Transfer close supervision     Verbal Cues safety     Assistive Device walker, front-wheeled     Comment chair, bed decreased control of descent        Toilet Transfer    Transfer Technique sit-stand;stand-sit     [place], Toilet Transfer close supervision     Verbal Cues hand placement     Assistive Device grab bars/safety frame     Comment declined commode frame use, reports owning a commode and planning to use as a shower chair        Shower Transfer    Comment OT provided demo and education on safe strategies        Balance    Static Sitting Balance WFL;supported;sitting in chair     Dynamic Sitting Balance WFL;unsupported;sitting in chair     Static Standing Balance mild impairment     Dynamic Standing Balance mild impairment;supported     Comment, Balance RW        Functional Reach Test    Trial One: Functional Reach Test (in) 10 inches     Trial Two: Functional Reach Test (in) 11 inches     Average (in) 10.5 inches        Motor Skills    Functional Endurance impaired: pain and fatigue but able to complete ADLs        Lower Body Dressing    Tasks socks;doff;don     [place] supervision     Position supported sitting     Comment no LH AE required        Grooming    Self-Performance washes, rinses and dries hands     [place] supervision     Position unsupported sitting        Toileting    [place] adjust/manage clothing;perform bladder hygiene;supervision     Comment seated        AM-PAC (TM) - ADL (Current  Function)    Putting on and taking off regular lower body clothing? 3 - A Little     Bathing? 3 - A Little     Toileting? 3 - A Little     Putting on/taking off regular upper body clothing? 4 - None     How much help for taking care of personal grooming? 3 - A Little     Eating meals? 4 - None     AM-PAC (TM) ADL Score 20        Assessment/Plan (OT)    Daily Outcome Statement AMPAC 20, SUP bed mobility, close SUP functional transfers, SUP toileting/grooming/LB Dressing, Pt with pain and fatigue but able to complete ADLs and reports spouse can assist at home, Pt declined further acute OT needs, Rec home with assist and home kurtis     Therapy Frequency evaluation only               OT Assessment/Plan    Flowsheet Row Most Recent Value   OT Recommended Discharge Disposition home with assistance, home with home health at 06/22/2022 1342   Anticipated Equipment Needs At Discharge (OT) walker, front-wheeled at 06/22/2022 1342   Patient/Family Therapy Goal Statement home, sleep at 06/22/2022 1342                    Education Documentation  Rehabilitation Therapy, taught by Miracle Fischer, OT at 6/22/2022  2:18 PM.  Learner: Patient  Readiness: Acceptance  Method: Explanation  Response: Verbalizes Understanding, Demonstrated Understanding  Comment: safe functional transfers, home safety, falls pervention,safe bathing, call bell, adaptive LB Dressing, Rec of SUP

## 2022-06-22 NOTE — PLAN OF CARE
Problem: Adult Inpatient Plan of Care  Goal: Plan of Care Review  Outcome: Progressing  Flowsheets (Taken 6/22/2022 5948)  Plan of Care Reviewed With: patient  Outcome Summary: OT: close SUP functional transfers with RW, SUP ADLs, reports having assist at home, no further acute OT needs

## 2022-06-22 NOTE — DISCHARGE INSTRUCTIONS
TOTAL KNEE  MEDICATION:    If you are taking Aspirin:    Enteric coated aspirin 2 times a day for blood clot prevention and take for 6 weeks.  May use over-the-counter Pepcid or Zantac if stomach upset occurs.    PAIN CONTROL:    You will be given a prescription for pain medication. Take your pain medicine as prescribed with food if possible. You can expect to have pain during the healing process from the incision and it will improve.     Pain medicine: use anti-inflammatories everyday until you see your doctor unless otherwise instructed. Narcotic pain reliever is to be taken only as needed for pain.    DO NOT DRIVE WHILE TAKING PAIN MEDICATION.    Some patients find that they have some difficulty with constipation after surgery. This is due to a combination of things. Most of this is due to the pain medication you are taking. The pain medications, along with a decrease in activity, can sometimes lead to discomfort from constipation. You should eat plenty of fresh fruits, vegetables, drink fruit juices and drink plenty of water.    INCISION CARE:    CHUY stockings to B/L LE    Mepilex dressing may be removed after 5 days  .  Look at incision every day. Keep incision clean and dry.    If you have steri-strips, leave them on until they fall off.    Your staples or stitches will be removed about 18- 24 days after surgery. Your incision will heal and the swelling and bruising will get better over the next 3 weeks.    If you have glue closing your incision, do not pull or pick off it will dissolve naturally.    No tub baths, swimming, Jacuzzi tubs or any other activity that would require submersion of your incision in water. Ok to shower.      Call your doctor if you notice any of the following:  Fever over 101.5 degrees  Drainage from incision  Increased swelling around incision  Increased redness around incision line  Thigh/calf pain or swelling in legs  Chest pain  Chest congestion  Problems with  breathing.    ACTIVITY:  Balance rest and activity.    You may be weight bearing as tolerated unless you have been instructed otherwise.    DO NOT SMOKE! Smoking is not healthy for your healing process.  No driving for 3 weeks or until seen by your physician post-op.    KNEE PRECAUTIONS:  No pillow under the knee.  No twisting or kneeling.    FOLLOW UP     Follow up with Dr. Villasenor in 2weeks. Call for an appt. [phone]    “For your and/or your 's information, important details about activity and expectations that were reviewed during your hospital stay can be at found in our Discharge Video overview at [email or web address] “

## 2022-06-22 NOTE — NURSING NOTE
IV DCed. All belongings sent home. Discharge instructions distributed and reviewed. Good comprehension by teach back.Home with

## 2022-06-22 NOTE — PROGRESS NOTES
POD# 1 s/p LTKA  Pt in NAD. Denies SOB/CP/. C/O Nausea and vomit x1.   Compazine and zofran given with little improvement. Pain controlled    VSS, Afebrile                             HGB:9.6    LLE- Dressing removed/Mepilex/Incision C/D/I> rewrapped with ace bandage           NVI/Distal Motor intact          Calf soft, NT    A/P: OOB with PT  Nausea> phenergan x 1 given  Continue DVT prophylaxis (ASA)  Continue Incentive Nadir. 10x/hr  Discharge to home when cleared by PT  PDMP reviewed

## 2022-06-22 NOTE — ASSESSMENT & PLAN NOTE
Patient has been suffering chronic Lyme disease for many years  Continue follow-up with primary care physician for further monitoring management

## 2022-06-22 NOTE — PROGRESS NOTES
Referral received and chart reviewed. Spoke with -Arcadio, agreeable to therapy services with [facility]. No equipment requested for home.  Referral completed.

## 2022-06-22 NOTE — PROGRESS NOTES
Patient: Silke Ashraf  Location: [facility] 4222  MRN: [id number]  Today's date: 6/22/2022    Attempted to see patient for therapy. Unable due to medical hold.   Rn reports Pt with low BP and receiving fluids, asking OT to re attempt in an hour or so

## 2022-06-22 NOTE — PROGRESS NOTES
Patient: Silke Ashraf  Location:  [address]  MRN:  [id number]  Today's date:  6/22/2022 Pt. In the chair. alarm on call bell in hand     Silke is a 66 y.o. female admitted on 6/21/2022 with Osteoarthritis of left knee, unspecified osteoarthritis type [M17.12]. Principal problem is Osteoarthritis of left knee, unspecified osteoarthritis type.    Past Medical History  Silke has a past medical history of Environmental and seasonal allergies, H/o Lyme disease, OA (osteoarthritis), and Skin cancer.    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 WBAT.      PT Vitals    Date/Time Pulse BP BP Method [facility]   06/22/22 0945 70 102/60 100/56 Manual [place]      PT Pain    Date/Time Pain Type Rating: Rest Rating: Activity Interventions [facility]   06/22/22 0945 Pain Assessment 7 8 position adjusted;cold applied [place]ER[place]          Prior Living Environment    Flowsheet Row Most Recent Value   Current Living Arrangements home   Living Environment Comment pt reports living in 2 story house with spouse with 3 LYUBOV with full flight to second floor to reach bed/bath.        Prior Level of Function    Flowsheet Row Most Recent Value   Dominant Hand right   Ambulation assistive equipment   Transferring assistive equipment   Toileting independent   Bathing independent   Dressing independent   Prior Level of Function Comment pt reports using SPC/RW baseline for ambulation and independent for ADLS,  + , + psychologist   Assistive Device Currently Used at Home cane, straight           PT Evaluation and Treatment - 06/22/22 0945        PT Time Calculation    Start Time 0945     Stop Time 1016     Time Calculation (min) 31 min        Session Details    Document Type daily treatment/progress note     Mode of Treatment physical therapy        General Information    Patient Profile Reviewed yes     General Observations of Patient Pt. in the bed Nausea earlier     Existing Precautions/Restrictions  hip;weight bearing        Weight-bearing Status    Left LE Weight-Bearing Status weight-bearing as tolerated (WBAT)        Range of Motion (ROM)    Comment: Range of Motion AROM -18-70 sitting L knee        Bed Mobility    [place], Supine to Sit close supervision     Comment (Bed Mobility) OOB to the L sitting at the edge of the bed needing time to reorient.        Sit to Stand Transfer    [place], Sit to Stand Transfer minimum assist (75% or more patient effort)     Assistive Device walker, front-wheeled     Comment on and off bed needing cueing to push up from the bed to reach for the walker.        Stand to Sit Transfer    [place], Stand to Sit Transfer minimum assist (75% or more patient effort);1 person assist     Assistive Device walker, front-wheeled     Comment sitting assisted to toilet with sitting off balance and assistance to reach to sink for support.        Gait Training    [place], Gait minimum assist (75% or more patient effort)     Assistive Device walker, front-wheeled     Distance in Feet 45 feet     Pattern (Gait) step-to     Comment (Gait/Stairs) Antalgic gait walker tipping three times needing recueing to keep equal weight through both hands.        10 Meter Walk Test (Self-Selected Velocity)    Trial One: Ten Meter Walk Test (sec) 0 seconds     Trial Two: Ten Meter Walk Test (sec) 0 seconds   pain limited       Safety Issues, Functional Mobility    Safety Issues Affecting Function (Mobility) insight into deficits/self-awareness     Impairments Affecting Function (Mobility) balance;coordination;postural/trunk control        Balance    Dynamic Sitting Balance mild impairment     Static Standing Balance mild impairment     Dynamic Standing Balance mild impairment     Comment, Balance RW        Therapeutic Exercise    Therapeutic Exercise lower extremity        Lower Extremity (Therapeutic Exercise)    Exercise Position/Type AROM (active range of motion)     General Exercise  ankle pumps;quad sets;gluteal sets;heel slides;LAQ (long arc quad)     Reps and Sets x4     Comment needed refocusing for exercises.        Coping    Observed Emotional State cooperative        AM-PAC (TM) - Mobility (Current Function)    Turning from your back to your side while in a flat bed without using bedrails? 3 - A Little     Moving from lying on your back to sitting on the side of a flat bed without using bedrails? 3 - A Little     Moving to and from a bed to a chair? 3 - A Little     Standing up from a chair using your arms? 3 - A Little     To walk in a hospital room? 3 - A Little     Climbing 3-5 steps with a railing? 3 - A Little     AM-PAC (TM) Mobility Score 18        Assessment/Plan (PT)    Daily Outcome Statement [facility] 18 Still with Nausea and pain during session , mobiity reducing nausea, Will benefit from cont PT RN notified of progress.     Rehab Potential good, to achieve stated therapy goals     Therapy Frequency 5 times/wk     Planned Therapy Interventions balance training;gait training;transfer training;ROM (range of motion);stair training               PT Assessment/Plan    Flowsheet Row Most Recent Value   PT Recommended Discharge Disposition home with home health, home with assistance at 06/22/2022 0945   Anticipated Equipment Needs at Discharge (PT) none at 06/22/2022 0945   Patient/Family Therapy Goals Statement to go home at 06/21/2022 1710                    Education Documentation  Activity, taught by Reese Gardiner PTA at 6/22/2022 10:26 AM.  Learner: Patient  Readiness: Acceptance  Method: Explanation, Demonstration  Response: Verbalizes Understanding, Needs Reinforcement  Comment: walker use, Transfers, Gait training with walker          PT Goals    Flowsheet Row Most Recent Value   Bed Mobility Goal 1    Activity/Assistive Device bed mobility activities, all at 06/21/2022 1710   [place] modified independence at 06/21/2022 1710   Time Frame by discharge at 06/21/2022 1710    Progress/Outcome goal ongoing at 06/21/2022 1710   Transfer Goal 1    Activity/Assistive Device all transfers, walker, front-wheeled at 06/21/2022 1710   [place] modified independence at 06/21/2022 1710   Time Frame by discharge at 06/21/2022 1710   Progress/Outcome goal ongoing at 06/21/2022 1710   Gait Training Goal 1    Activity/Assistive Device gait (walking locomotion), assistive device use at 06/21/2022 1710   [place] modified independence at 06/21/2022 1710   Distance 150 at 06/21/2022 1710   Time Frame by discharge at 06/21/2022 1710   Progress/Outcome goal ongoing at 06/21/2022 1710   Stairs Goal 1    Activity/Assistive Device stairs, all skills, assistive device use at 06/21/2022 1710   [place] supervision required at 06/21/2022 1710   Number of Stairs 12 at 06/21/2022 1710   Time Frame by discharge at 06/21/2022 1710   Progress/Outcome goal ongoing at 06/21/2022 1710

## 2022-06-22 NOTE — PROGRESS NOTES
Patient: Silke Ashraf  Location:  [address]  MRN:  [id number]  Today's date:  6/22/2022 Pt in the chair alarm on   RN notified.  Silke is a 66 y.o. female admitted on 6/21/2022 with Osteoarthritis of left knee, unspecified osteoarthritis type [M17.12]. Principal problem is Osteoarthritis of left knee, unspecified osteoarthritis type.    Past Medical History  Silke has a past medical history of Environmental and seasonal allergies, H/o Lyme disease, OA (osteoarthritis), and Skin cancer.    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 WBAT.      PT Vitals    Date/Time Pulse BP [facility]   06/22/22 1449 80 110/60 [place]      PT Pain    Date/Time Pain Type Side/Orientation Location Rating: Rest Rating: Activity Interventions [facility]   06/22/22 1449 Pain Assessment left knee 4 4 position adjusted [place]          Prior Living Environment    Flowsheet Row Most Recent Value   Current Living Arrangements home   Living Environment Comment pt reports living in 2 story house with spouse with 3 LYUBOV with full flight to second floor to reach bed/bath.        Prior Level of Function    Flowsheet Row Most Recent Value   Dominant Hand right   Ambulation assistive equipment   Transferring assistive equipment   Toileting independent   Bathing independent   Dressing independent   Prior Level of Function Comment pt reports using SPC/RW baseline for ambulation and independent for ADLS,  + , + psychologist   Assistive Device Currently Used at Home cane, straight, other (see comments)  [patient owns a rollator and has a 3:1 commode]           PT Evaluation and Treatment - 06/22/22 1449        PT Time Calculation    Start Time 1449     Stop Time 1515     Time Calculation (min) 26 min        Session Details    Document Type daily treatment/progress note     Mode of Treatment physical therapy        General Information    Patient Profile Reviewed yes     Onset of Illness/Injury or Date of  Surgery 06/21/22     General Observations of Patient Pt in bed     Existing Precautions/Restrictions fall;weight bearing        Weight-bearing Status    Left LE Weight-Bearing Status weight-bearing as tolerated (WBAT)        Bed Mobility    Centre, Supine to Sit independent     Centre, Sit to Supine independent     Comment (Bed Mobility) to the L        Sit to Stand Transfer    Centre, Sit to Stand Transfer supervision     Assistive Device walker, front-wheeled     Comment steady three times        Stand to Sit Transfer    Centre, Stand to Sit Transfer supervision     Assistive Device walker, front-wheeled     Comment Steady sitting allowing L knee to bend        Car Transfer    Transfer Technique stand-sit     Centre, Car Transfer minimum assist (75% or more patient effort)     Comment  will assist L LE into car.        Gait Training    Centre, Gait supervision     Assistive Device walker, front-wheeled     Distance in Feet 110 feet     Pattern (Gait) step-to;step-through     Comment (Gait/Stairs) Antalgic but with focusing on gait mechanics less antalgic .  Follows step to  with tuning.        10 Meter Walk Test (Self-Selected Velocity)    Trial One: Ten Meter Walk Test (sec) 13 seconds     Trial Two: Ten Meter Walk Test (sec) 16 seconds     Trial One: Gait Speed (m/s) 0.46 m/s     Trial Two: Gait Speed (m/s) 0.38 m/s     Average Gait Speed (m/s): Two Trials 0.42 m/s        Stairs Training    Centre, Stairs supervision     Handrail Location (Stairs) left side (ascending);right side (ascending)     Number of Stairs 6     Comment steady following cueing.        Safety Issues, Functional Mobility    Safety Issues Affecting Function (Mobility) insight into deficits/self-awareness        Balance    Static Sitting Balance WFL     Dynamic Sitting Balance WFL     Static Standing Balance WFL     Dynamic Standing Balance mild impairment;supported     Comment, Balance RW         Lower Extremity (Therapeutic Exercise)    Exercise Position/Type AROM (active range of motion)     General Exercise ankle pumps;quad sets;heel slides;gluteal sets;LAQ (long arc quad)     Reps and Sets x4        AM-PAC (TM) - Mobility (Current Function)    Turning from your back to your side while in a flat bed without using bedrails? 4 - None     Moving from lying on your back to sitting on the side of a flat bed without using bedrails? 4 - None     Moving to and from a bed to a chair? 3 - A Little     Standing up from a chair using your arms? 3 - A Little     To walk in a hospital room? 3 - A Little     Climbing 3-5 steps with a railing? 3 - A Little     AM-PAC (TM) Mobility Score 20        Assessment/Plan (PT)    Daily Outcome Statement [facility] 20 progressing with gait  steady with RW benefits from RW tolsarating steps supervision. Will have A into the car.     Rehab Potential good, to achieve stated therapy goals     Therapy Frequency 5 times/wk     Planned Therapy Interventions balance training;gait training;transfer training               PT Assessment/Plan    Flowsheet Row Most Recent Value   PT Recommended Discharge Disposition home with assistance, home with home health at 06/22/2022 1449   Anticipated Equipment Needs at Discharge (PT) walker, front-wheeled  [Old rollator needs RW] at 06/22/2022 1449   Patient/Family Therapy Goals Statement to go home at 06/21/2022 1710                    Education Documentation  Post op Program Education, taught by Reese Gardiner PTA at 6/22/2022  3:29 PM.  Learner: Patient  Readiness: Acceptance  Method: Explanation  Response: Verbalizes Understanding, Needs Reinforcement  Comment: Step to gait if having pain. Stairs sequence Ice use    Activity, taught by Reese Gardiner PTA at 6/22/2022 10:26 AM.  Learner: Patient  Readiness: Acceptance  Method: Explanation, Demonstration  Response: Verbalizes Understanding, Needs Reinforcement  Comment: walker use, Transfers, Gait  training with walker          PT Goals    Flowsheet Row Most Recent Value   Bed Mobility Goal 1    Activity/Assistive Device bed mobility activities, all at 06/21/2022 1710   [place] modified independence at 06/21/2022 1710   Time Frame by discharge at 06/21/2022 1710   Progress/Outcome goal ongoing at 06/21/2022 1710   Transfer Goal 1    Activity/Assistive Device all transfers, walker, front-wheeled at 06/21/2022 1710   [place] modified independence at 06/21/2022 1710   Time Frame by discharge at 06/21/2022 1710   Progress/Outcome goal ongoing at 06/21/2022 1710   Gait Training Goal 1    Activity/Assistive Device gait (walking locomotion), assistive device use at 06/21/2022 1710   [place] modified independence at 06/21/2022 1710   Distance 150 at 06/21/2022 1710   Time Frame by discharge at 06/21/2022 1710   Progress/Outcome goal ongoing at 06/21/2022 1710   Stairs Goal 1    Activity/Assistive Device stairs, all skills, assistive device use at 06/21/2022 1710   [place] supervision required at 06/21/2022 1710   Number of Stairs 12 at 06/21/2022 1710   Time Frame by discharge at 06/21/2022 1710   Progress/Outcome goal ongoing at 06/21/2022 1710

## 2022-06-23 NOTE — UM PHYSICIAN REVIEW NOTE
Utilization Secondary Review Note      Patient Name: Silke Ashraf      MRN: [id number]  Insurance: MEDICARE  Admission date: 6/21/2022  Initial order:    Inpatient  Planned admission: Yes  Post Discharge Review: Yes            Outpatient services are appropriate for this 66 y.o. female with 1 midnight hospital stay s/p TKA.     Kari Bolden, DO  6/23/2022

## 2022-06-23 NOTE — UM PHYSICIAN REVIEW NOTE
Utilization Secondary Review Note      Patient Name: Silke Ashraf      MRN: [id number]  Insurance: MEDICARE  Admission date: 6/21/2022  Initial order:    Inpatient  Planned admission: Yes  Post Discharge Review: Yes            Outpatient services are appropriate for this 66 y.o. year old patient who presents for left knee arthoplasty CPT 04780 not on the IPO list and care only crossed 1 MN    Will need 2nd reviewer.     Jered Lowe MD  6/23/2022

## 2022-06-24 LAB
CASE RPRT: NORMAL
CLINICAL INFO: NORMAL
IMMUNE STAIN STUDY: NORMAL
PATH REPORT.FINAL DX SPEC: NORMAL
PATH REPORT.FINAL DX SPEC: NORMAL
PATH REPORT.GROSS SPEC: NORMAL

## 2022-06-26 LAB
GRAM STN SPEC: NORMAL
GRAM STN SPEC: NORMAL
MICROORGANISM SPEC CULT: NORMAL

## 2022-07-19 LAB — FUNGUS SPEC CULT: NORMAL

## 2022-08-03 LAB — MYCOBACTERIUM SPEC CULT: NORMAL

## 2022-08-23 ENCOUNTER — HOSPITAL ENCOUNTER (OUTPATIENT)
Facility: HOSPITAL | Age: 67
Setting detail: HOSPITAL OUTPATIENT SURGERY
End: 2022-08-23
Attending: ORTHOPAEDIC SURGERY | Admitting: ORTHOPAEDIC SURGERY
Payer: MEDICARE

## 2022-09-14 ENCOUNTER — APPOINTMENT (OUTPATIENT)
Dept: URBAN - METROPOLITAN AREA CLINIC 203 | Age: 67
Setting detail: DERMATOLOGY
End: 2022-09-18

## 2022-09-14 DIAGNOSIS — L57.8 OTHER SKIN CHANGES DUE TO CHRONIC EXPOSURE TO NONIONIZING RADIATION: ICD-10-CM

## 2022-09-14 DIAGNOSIS — T07XXXA INSECT BITE, NONVENOMOUS, OF OTHER, MULTIPLE, AND UNSPECIFIED SITES, WITHOUT MENTION OF INFECTION: ICD-10-CM

## 2022-09-14 DIAGNOSIS — Z85.828 PERSONAL HISTORY OF OTHER MALIGNANT NEOPLASM OF SKIN: ICD-10-CM

## 2022-09-14 DIAGNOSIS — Z11.52 ENCOUNTER FOR SCREENING FOR COVID-19: ICD-10-CM

## 2022-09-14 DIAGNOSIS — L20.84 INTRINSIC (ALLERGIC) ECZEMA: ICD-10-CM

## 2022-09-14 DIAGNOSIS — L57.0 ACTINIC KERATOSIS: ICD-10-CM

## 2022-09-14 PROBLEM — S20.369A INSECT BITE (NONVENOMOUS) OF UNSPECIFIED FRONT WALL OF THORAX, INITIAL ENCOUNTER: Status: ACTIVE | Noted: 2022-09-14

## 2022-09-14 PROCEDURE — OTHER REASSURANCE: OTHER

## 2022-09-14 PROCEDURE — OTHER LIQUID NITROGEN: OTHER

## 2022-09-14 PROCEDURE — OTHER COUNSELING: OTHER

## 2022-09-14 PROCEDURE — 17000 DESTRUCT PREMALG LESION: CPT

## 2022-09-14 PROCEDURE — 99213 OFFICE O/P EST LOW 20 MIN: CPT | Mod: 25

## 2022-09-14 PROCEDURE — OTHER SUNSCREEN RECOMMENDATIONS: OTHER

## 2022-09-14 PROCEDURE — OTHER PRESCRIPTION MEDICATION MANAGEMENT: OTHER

## 2022-09-14 PROCEDURE — OTHER SCREENING FOR COVID-19: OTHER

## 2022-09-14 ASSESSMENT — LOCATION DETAILED DESCRIPTION DERM
LOCATION DETAILED: LEFT INFERIOR CENTRAL MALAR CHEEK
LOCATION DETAILED: RIGHT INFERIOR ANTERIOR NECK
LOCATION DETAILED: RIGHT DISTAL PRETIBIAL REGION
LOCATION DETAILED: RIGHT INFERIOR LATERAL NECK
LOCATION DETAILED: LEFT MEDIAL BREAST 11-12:00 REGION
LOCATION DETAILED: RIGHT LATERAL SUPERIOR CHEST
LOCATION DETAILED: LEFT DISTAL PRETIBIAL REGION
LOCATION DETAILED: LEFT MEDIAL BREAST 10-11:00 REGION
LOCATION DETAILED: PERIUMBILICAL SKIN
LOCATION DETAILED: EPIGASTRIC SKIN

## 2022-09-14 ASSESSMENT — LOCATION SIMPLE DESCRIPTION DERM
LOCATION SIMPLE: LEFT BREAST
LOCATION SIMPLE: LEFT CHEEK
LOCATION SIMPLE: ABDOMEN
LOCATION SIMPLE: CHEST
LOCATION SIMPLE: RIGHT PRETIBIAL REGION
LOCATION SIMPLE: LEFT PRETIBIAL REGION
LOCATION SIMPLE: RIGHT ANTERIOR NECK

## 2022-09-14 ASSESSMENT — PAIN INTENSITY VAS: HOW INTENSE IS YOUR PAIN 0 BEING NO PAIN, 10 BEING THE MOST SEVERE PAIN POSSIBLE?: NO PAIN

## 2022-09-14 ASSESSMENT — LOCATION ZONE DERM
LOCATION ZONE: TRUNK
LOCATION ZONE: FACE
LOCATION ZONE: NECK
LOCATION ZONE: LEG

## 2022-09-14 NOTE — PROCEDURE: LIQUID NITROGEN
Show Applicator Variable?: Yes
Consent: The patient's consent was obtained including but not limited to risks of crusting, scabbing, blistering, scarring, darker or lighter pigmentary change, recurrence, incomplete removal and infection.
Render Note In Bullet Format When Appropriate: No
Application Tool (Optional): Liquid Nitrogen Sprayer
Duration Of Freeze Thaw-Cycle (Seconds): 0
Post-Care Instructions: I reviewed with the patient in detail post-care instructions. Patient is to wear sunprotection, and avoid picking at any of the treated lesions. Pt may apply Vaseline to crusted or scabbing areas.
Detail Level: Detailed

## 2023-09-20 ENCOUNTER — APPOINTMENT (OUTPATIENT)
Dept: URBAN - METROPOLITAN AREA CLINIC 203 | Age: 68
Setting detail: DERMATOLOGY
End: 2023-09-25

## 2023-09-20 DIAGNOSIS — L57.8 OTHER SKIN CHANGES DUE TO CHRONIC EXPOSURE TO NONIONIZING RADIATION: ICD-10-CM

## 2023-09-20 DIAGNOSIS — Z85.828 PERSONAL HISTORY OF OTHER MALIGNANT NEOPLASM OF SKIN: ICD-10-CM

## 2023-09-20 DIAGNOSIS — L57.0 ACTINIC KERATOSIS: ICD-10-CM

## 2023-09-20 PROCEDURE — 99213 OFFICE O/P EST LOW 20 MIN: CPT | Mod: 25

## 2023-09-20 PROCEDURE — OTHER COUNSELING: OTHER

## 2023-09-20 PROCEDURE — 17000 DESTRUCT PREMALG LESION: CPT

## 2023-09-20 PROCEDURE — OTHER LIQUID NITROGEN: OTHER

## 2023-09-20 PROCEDURE — OTHER SUNSCREEN RECOMMENDATIONS: OTHER

## 2023-09-20 ASSESSMENT — LOCATION DETAILED DESCRIPTION DERM
LOCATION DETAILED: RIGHT CENTRAL LATERAL NECK
LOCATION DETAILED: PERIUMBILICAL SKIN
LOCATION DETAILED: LEFT MEDIAL BREAST 11-12:00 REGION
LOCATION DETAILED: LEFT MEDIAL BREAST 10-11:00 REGION

## 2023-09-20 ASSESSMENT — LOCATION SIMPLE DESCRIPTION DERM
LOCATION SIMPLE: LEFT BREAST
LOCATION SIMPLE: NECK
LOCATION SIMPLE: ABDOMEN

## 2023-09-20 ASSESSMENT — LOCATION ZONE DERM
LOCATION ZONE: NECK
LOCATION ZONE: TRUNK

## 2023-09-20 ASSESSMENT — PAIN INTENSITY VAS: HOW INTENSE IS YOUR PAIN 0 BEING NO PAIN, 10 BEING THE MOST SEVERE PAIN POSSIBLE?: NO PAIN

## 2023-09-20 NOTE — PROCEDURE: LIQUID NITROGEN
Duration Of Freeze Thaw-Cycle (Seconds): 5
Detail Level: Zone
Render Note In Bullet Format When Appropriate: No
Post-Care Instructions: I reviewed with the patient in detail post-care instructions. Patient is to wear sunprotection, and avoid picking at any of the treated lesions. Pt may apply Vaseline to crusted or scabbing areas.
Number Of Freeze-Thaw Cycles: 2 freeze-thaw cycles
Show Aperture Variable?: Yes
Consent: The patient's consent was obtained including but not limited to risks of crusting, scabbing, blistering, scarring, darker or lighter pigmentary change, recurrence, incomplete removal and infection.
Application Tool (Optional): Liquid Nitrogen Sprayer

## 2024-01-24 ENCOUNTER — APPOINTMENT (OUTPATIENT)
Dept: URBAN - METROPOLITAN AREA CLINIC 203 | Age: 69
Setting detail: DERMATOLOGY
End: 2024-01-29

## 2024-01-24 DIAGNOSIS — D18.0 HEMANGIOMA: ICD-10-CM

## 2024-01-24 DIAGNOSIS — L81.4 OTHER MELANIN HYPERPIGMENTATION: ICD-10-CM

## 2024-01-24 PROBLEM — D18.01 HEMANGIOMA OF SKIN AND SUBCUTANEOUS TISSUE: Status: ACTIVE | Noted: 2024-01-24

## 2024-01-24 PROCEDURE — OTHER COUNSELING: OTHER

## 2024-01-24 PROCEDURE — OTHER DEFER: OTHER

## 2024-01-24 PROCEDURE — 99212 OFFICE O/P EST SF 10 MIN: CPT

## 2024-01-24 ASSESSMENT — LOCATION SIMPLE DESCRIPTION DERM: LOCATION SIMPLE: LEFT UPPER BACK

## 2024-01-24 ASSESSMENT — LOCATION ZONE DERM: LOCATION ZONE: TRUNK

## 2024-01-24 ASSESSMENT — LOCATION DETAILED DESCRIPTION DERM: LOCATION DETAILED: LEFT INFERIOR MEDIAL UPPER BACK

## 2024-03-20 ENCOUNTER — APPOINTMENT (OUTPATIENT)
Dept: URBAN - METROPOLITAN AREA CLINIC 203 | Age: 69
Setting detail: DERMATOLOGY
End: 2024-03-25

## 2024-03-20 DIAGNOSIS — L20.89 OTHER ATOPIC DERMATITIS: ICD-10-CM

## 2024-03-20 DIAGNOSIS — L81.4 OTHER MELANIN HYPERPIGMENTATION: ICD-10-CM

## 2024-03-20 DIAGNOSIS — L57.8 OTHER SKIN CHANGES DUE TO CHRONIC EXPOSURE TO NONIONIZING RADIATION: ICD-10-CM

## 2024-03-20 PROCEDURE — OTHER REASSURANCE: OTHER

## 2024-03-20 PROCEDURE — OTHER COUNSELING: OTHER

## 2024-03-20 PROCEDURE — 99213 OFFICE O/P EST LOW 20 MIN: CPT

## 2024-03-20 PROCEDURE — OTHER RECOMMENDATIONS: OTHER

## 2024-03-20 ASSESSMENT — LOCATION SIMPLE DESCRIPTION DERM
LOCATION SIMPLE: ABDOMEN
LOCATION SIMPLE: LEFT ANTERIOR NECK
LOCATION SIMPLE: CHEST

## 2024-03-20 ASSESSMENT — LOCATION ZONE DERM
LOCATION ZONE: TRUNK
LOCATION ZONE: NECK

## 2024-03-20 ASSESSMENT — LOCATION DETAILED DESCRIPTION DERM
LOCATION DETAILED: LEFT INFERIOR ANTERIOR NECK
LOCATION DETAILED: PERIUMBILICAL SKIN
LOCATION DETAILED: STERNAL NOTCH

## 2024-03-20 NOTE — PROCEDURE: RECOMMENDATIONS
Detail Level: Zone
Render Risk Assessment In Note?: no
Recommendations (Free Text): Eucerin eczema cream
Recommendation Preamble: The following recommendations were made during the visit:

## 2024-04-08 ENCOUNTER — APPOINTMENT (OUTPATIENT)
Dept: URBAN - METROPOLITAN AREA CLINIC 203 | Age: 69
Setting detail: DERMATOLOGY
End: 2024-04-15

## 2024-04-08 DIAGNOSIS — D485 NEOPLASM OF UNCERTAIN BEHAVIOR OF SKIN: ICD-10-CM

## 2024-04-08 DIAGNOSIS — L82.1 OTHER SEBORRHEIC KERATOSIS: ICD-10-CM

## 2024-04-08 DIAGNOSIS — L57.0 ACTINIC KERATOSIS: ICD-10-CM

## 2024-04-08 PROBLEM — D48.5 NEOPLASM OF UNCERTAIN BEHAVIOR OF SKIN: Status: ACTIVE | Noted: 2024-04-08

## 2024-04-08 PROCEDURE — OTHER REASSURANCE: OTHER

## 2024-04-08 PROCEDURE — OTHER LIQUID NITROGEN: OTHER

## 2024-04-08 PROCEDURE — 17000 DESTRUCT PREMALG LESION: CPT

## 2024-04-08 PROCEDURE — 99212 OFFICE O/P EST SF 10 MIN: CPT | Mod: 25

## 2024-04-08 PROCEDURE — OTHER PHOTO-DOCUMENTATION: OTHER

## 2024-04-08 PROCEDURE — 17003 DESTRUCT PREMALG LES 2-14: CPT

## 2024-04-08 PROCEDURE — OTHER DEFER: OTHER

## 2024-04-08 PROCEDURE — OTHER ADDITIONAL NOTES: OTHER

## 2024-04-08 ASSESSMENT — LOCATION SIMPLE DESCRIPTION DERM
LOCATION SIMPLE: LEFT CHEEK
LOCATION SIMPLE: LEFT ZYGOMA
LOCATION SIMPLE: RIGHT SHOULDER
LOCATION SIMPLE: RIGHT CHEEK
LOCATION SIMPLE: RIGHT LOWER BACK
LOCATION SIMPLE: LEFT KNEE
LOCATION SIMPLE: CHEST

## 2024-04-08 ASSESSMENT — LOCATION DETAILED DESCRIPTION DERM
LOCATION DETAILED: RIGHT ANTERIOR SHOULDER
LOCATION DETAILED: RIGHT INFERIOR MEDIAL MIDBACK
LOCATION DETAILED: LEFT LATERAL KNEE
LOCATION DETAILED: LEFT SUPERIOR CENTRAL MALAR CHEEK
LOCATION DETAILED: RIGHT INFERIOR CENTRAL MALAR CHEEK
LOCATION DETAILED: RIGHT SUPERIOR MEDIAL MIDBACK
LOCATION DETAILED: LEFT MEDIAL ZYGOMA
LOCATION DETAILED: LEFT CENTRAL ZYGOMA
LOCATION DETAILED: RIGHT INFERIOR MEDIAL MALAR CHEEK
LOCATION DETAILED: UPPER STERNUM
LOCATION DETAILED: RIGHT LATERAL SUPERIOR CHEST

## 2024-04-08 ASSESSMENT — LOCATION ZONE DERM
LOCATION ZONE: TRUNK
LOCATION ZONE: FACE
LOCATION ZONE: ARM
LOCATION ZONE: LEG

## 2024-04-08 ASSESSMENT — PAIN INTENSITY VAS: HOW INTENSE IS YOUR PAIN 0 BEING NO PAIN, 10 BEING THE MOST SEVERE PAIN POSSIBLE?: NO PAIN

## 2024-04-08 NOTE — PROCEDURE: LIQUID NITROGEN
Render Post-Care Instructions In Note?: no
Number Of Freeze-Thaw Cycles: 3 freeze-thaw cycles
Duration Of Freeze Thaw-Cycle (Seconds): 0
Post-Care Instructions: I reviewed with the patient in detail post-care instructions. Patient is to wear sunprotection, and avoid picking at any of the treated lesions. Pt may apply Vaseline to crusted or scabbing areas.
Show Applicator Variable?: Yes
Detail Level: Detailed
Consent: The patient's consent was obtained including but not limited to risks of crusting, scabbing, blistering, scarring, darker or lighter pigmentary change, recurrence, incomplete removal and infection.

## 2024-04-08 NOTE — PROCEDURE: ADDITIONAL NOTES
Additional Notes: Bx if not resolved/healed
Detail Level: Simple
Render Risk Assessment In Note?: no

## 2024-04-29 ENCOUNTER — APPOINTMENT (OUTPATIENT)
Dept: URBAN - METROPOLITAN AREA CLINIC 203 | Age: 69
Setting detail: DERMATOLOGY
End: 2024-05-02

## 2024-04-29 DIAGNOSIS — L57.0 ACTINIC KERATOSIS: ICD-10-CM

## 2024-04-29 DIAGNOSIS — D485 NEOPLASM OF UNCERTAIN BEHAVIOR OF SKIN: ICD-10-CM

## 2024-04-29 PROBLEM — D48.5 NEOPLASM OF UNCERTAIN BEHAVIOR OF SKIN: Status: ACTIVE | Noted: 2024-04-29

## 2024-04-29 PROCEDURE — OTHER BIOPSY BY SHAVE METHOD: OTHER

## 2024-04-29 PROCEDURE — 11102 TANGNTL BX SKIN SINGLE LES: CPT

## 2024-04-29 PROCEDURE — 17000 DESTRUCT PREMALG LESION: CPT | Mod: 59

## 2024-04-29 PROCEDURE — OTHER LIQUID NITROGEN: OTHER

## 2024-04-29 PROCEDURE — 17003 DESTRUCT PREMALG LES 2-14: CPT

## 2024-04-29 ASSESSMENT — LOCATION ZONE DERM
LOCATION ZONE: FACE
LOCATION ZONE: HAND

## 2024-04-29 ASSESSMENT — LOCATION SIMPLE DESCRIPTION DERM
LOCATION SIMPLE: RIGHT CHEEK
LOCATION SIMPLE: RIGHT HAND

## 2024-04-29 ASSESSMENT — LOCATION DETAILED DESCRIPTION DERM
LOCATION DETAILED: RIGHT CENTRAL MALAR CHEEK
LOCATION DETAILED: RIGHT RADIAL DORSAL HAND

## 2024-04-29 NOTE — PROCEDURE: LIQUID NITROGEN
Consent: The patient's consent was obtained including but not limited to risks of crusting, scabbing, blistering, scarring, darker or lighter pigmentary change, recurrence, incomplete removal and infection.
Number Of Freeze-Thaw Cycles: 3 freeze-thaw cycles
Render Post-Care Instructions In Note?: no
Detail Level: Detailed
Show Aperture Variable?: Yes
Post-Care Instructions: I reviewed with the patient in detail post-care instructions. Patient is to wear sunprotection, and avoid picking at any of the treated lesions. Pt may apply Vaseline to crusted or scabbing areas.
Duration Of Freeze Thaw-Cycle (Seconds): 0

## 2024-04-29 NOTE — PROCEDURE: BIOPSY BY SHAVE METHOD
Detail Level: Detailed
Depth Of Biopsy: dermis
Was A Bandage Applied: Yes
Size Of Lesion In Cm: 0
Biopsy Type: H and E
Biopsy Method: Dermablade
Anesthesia Type: 1% lidocaine with epinephrine
Anesthesia Volume In Cc: 0.5
Hemostasis: Drysol
Wound Care: Petrolatum
Dressing: bandage
Destruction After The Procedure: No
Type Of Destruction Used: Curettage
Curettage Text: The wound bed was treated with curettage after the biopsy was performed.
Cryotherapy Text: The wound bed was treated with cryotherapy after the biopsy was performed.
Electrodesiccation Text: The wound bed was treated with electrodesiccation after the biopsy was performed.
Electrodesiccation And Curettage Text: The wound bed was treated with electrodesiccation and curettage after the biopsy was performed.
Silver Nitrate Text: The wound bed was treated with silver nitrate after the biopsy was performed.
Lab: [id number]
Consent: Written consent was obtained and risks were reviewed including but not limited to scarring, infection, bleeding, scabbing, incomplete removal, nerve damage and allergy to anesthesia.
Post-Care Instructions: I reviewed with the patient in detail post-care instructions. Patient is to keep the biopsy site dry overnight, and then apply bacitracin twice daily until healed. Patient may apply hydrogen peroxide soaks to remove any crusting.
Notification Instructions: Patient will be notified of biopsy results. However, patient instructed to call the office if not contacted within 2 weeks.
Billing Type: Third-Party Bill
Information: Selecting Yes will display possible errors in your note based on the variables you have selected. This validation is only offered as a suggestion for you. PLEASE NOTE THAT THE VALIDATION TEXT WILL BE REMOVED WHEN YOU FINALIZE YOUR NOTE. IF YOU WANT TO FAX A PRELIMINARY NOTE YOU WILL NEED TO TOGGLE THIS TO 'NO' IF YOU DO NOT WANT IT IN YOUR FAXED NOTE.

## 2024-09-23 ENCOUNTER — APPOINTMENT (OUTPATIENT)
Dept: URBAN - METROPOLITAN AREA CLINIC 203 | Age: 69
Setting detail: DERMATOLOGY
End: 2024-09-26

## 2024-09-23 DIAGNOSIS — L57.0 ACTINIC KERATOSIS: ICD-10-CM

## 2024-09-23 DIAGNOSIS — L57.8 OTHER SKIN CHANGES DUE TO CHRONIC EXPOSURE TO NONIONIZING RADIATION: ICD-10-CM

## 2024-09-23 DIAGNOSIS — L81.4 OTHER MELANIN HYPERPIGMENTATION: ICD-10-CM

## 2024-09-23 DIAGNOSIS — D22 MELANOCYTIC NEVI: ICD-10-CM

## 2024-09-23 DIAGNOSIS — D18.0 HEMANGIOMA: ICD-10-CM

## 2024-09-23 PROBLEM — D18.01 HEMANGIOMA OF SKIN AND SUBCUTANEOUS TISSUE: Status: ACTIVE | Noted: 2024-09-23

## 2024-09-23 PROBLEM — D22.5 MELANOCYTIC NEVI OF TRUNK: Status: ACTIVE | Noted: 2024-09-23

## 2024-09-23 PROCEDURE — OTHER SUNSCREEN RECOMMENDATIONS: OTHER

## 2024-09-23 PROCEDURE — OTHER COUNSELING: OTHER

## 2024-09-23 PROCEDURE — OTHER REASSURANCE: OTHER

## 2024-09-23 PROCEDURE — OTHER DIAGNOSIS COMMENT: OTHER

## 2024-09-23 PROCEDURE — 99213 OFFICE O/P EST LOW 20 MIN: CPT

## 2024-09-23 ASSESSMENT — LOCATION DETAILED DESCRIPTION DERM
LOCATION DETAILED: LEFT RADIAL DORSAL HAND
LOCATION DETAILED: LEFT MEDIAL BREAST 11-12:00 REGION
LOCATION DETAILED: RIGHT SUPERIOR MEDIAL UPPER BACK
LOCATION DETAILED: LEFT CLAVICULAR SKIN
LOCATION DETAILED: LEFT MEDIAL BREAST 10-11:00 REGION
LOCATION DETAILED: LEFT LATERAL ABDOMEN

## 2024-09-23 ASSESSMENT — LOCATION ZONE DERM
LOCATION ZONE: HAND
LOCATION ZONE: TRUNK

## 2024-09-23 ASSESSMENT — LOCATION SIMPLE DESCRIPTION DERM
LOCATION SIMPLE: ABDOMEN
LOCATION SIMPLE: LEFT HAND
LOCATION SIMPLE: RIGHT UPPER BACK
LOCATION SIMPLE: LEFT CLAVICULAR SKIN
LOCATION SIMPLE: LEFT BREAST

## 2025-03-24 ENCOUNTER — APPOINTMENT (OUTPATIENT)
Dept: URBAN - METROPOLITAN AREA CLINIC 203 | Age: 70
Setting detail: DERMATOLOGY
End: 2025-03-24

## 2025-03-24 DIAGNOSIS — L81.4 OTHER MELANIN HYPERPIGMENTATION: ICD-10-CM

## 2025-03-24 DIAGNOSIS — L57.0 ACTINIC KERATOSIS: ICD-10-CM

## 2025-03-24 DIAGNOSIS — L24 IRRITANT CONTACT DERMATITIS: ICD-10-CM

## 2025-03-24 DIAGNOSIS — D18.0 HEMANGIOMA: ICD-10-CM

## 2025-03-24 DIAGNOSIS — D22 MELANOCYTIC NEVI: ICD-10-CM

## 2025-03-24 DIAGNOSIS — L57.8 OTHER SKIN CHANGES DUE TO CHRONIC EXPOSURE TO NONIONIZING RADIATION: ICD-10-CM

## 2025-03-24 PROBLEM — L24.9 IRRITANT CONTACT DERMATITIS, UNSPECIFIED CAUSE: Status: ACTIVE | Noted: 2025-03-24

## 2025-03-24 PROBLEM — D18.01 HEMANGIOMA OF SKIN AND SUBCUTANEOUS TISSUE: Status: ACTIVE | Noted: 2025-03-24

## 2025-03-24 PROBLEM — D22.5 MELANOCYTIC NEVI OF TRUNK: Status: ACTIVE | Noted: 2025-03-24

## 2025-03-24 PROCEDURE — OTHER SUNSCREEN RECOMMENDATIONS: OTHER

## 2025-03-24 PROCEDURE — OTHER LIQUID NITROGEN: OTHER

## 2025-03-24 PROCEDURE — 17000 DESTRUCT PREMALG LESION: CPT

## 2025-03-24 PROCEDURE — OTHER COUNSELING: OTHER

## 2025-03-24 PROCEDURE — OTHER PRESCRIPTION MEDICATION MANAGEMENT: OTHER

## 2025-03-24 PROCEDURE — 99213 OFFICE O/P EST LOW 20 MIN: CPT | Mod: 25

## 2025-03-24 PROCEDURE — OTHER REASSURANCE: OTHER

## 2025-03-24 ASSESSMENT — LOCATION DETAILED DESCRIPTION DERM
LOCATION DETAILED: LEFT MEDIAL BREAST 11-12:00 REGION
LOCATION DETAILED: LEFT LATERAL ABDOMEN
LOCATION DETAILED: LEFT MEDIAL BREAST 10-11:00 REGION
LOCATION DETAILED: RIGHT SUPERIOR MEDIAL UPPER BACK
LOCATION DETAILED: LEFT POSTERIOR NECK
LOCATION DETAILED: LEFT CLAVICULAR SKIN

## 2025-03-24 ASSESSMENT — LOCATION SIMPLE DESCRIPTION DERM
LOCATION SIMPLE: LEFT BREAST
LOCATION SIMPLE: LEFT CLAVICULAR SKIN
LOCATION SIMPLE: POSTERIOR NECK
LOCATION SIMPLE: RIGHT UPPER BACK
LOCATION SIMPLE: ABDOMEN

## 2025-03-24 ASSESSMENT — PAIN INTENSITY VAS: HOW INTENSE IS YOUR PAIN 0 BEING NO PAIN, 10 BEING THE MOST SEVERE PAIN POSSIBLE?: NO PAIN

## 2025-03-24 ASSESSMENT — LOCATION ZONE DERM
LOCATION ZONE: NECK
LOCATION ZONE: TRUNK

## 2025-03-24 NOTE — PROCEDURE: PRESCRIPTION MEDICATION MANAGEMENT
Plan: Recommended scalpsun OTC
Samples Given: Dermeleve scalp oil
Render In Strict Bullet Format?: No
Detail Level: Zone

## (undated) DEVICE — PAD GROUND ELECTROSURGICAL W/CORD

## (undated) DEVICE — ***USE 138530*** SUTURE VICRYL 2-0 J259H CT-1 UNDYED

## (undated) DEVICE — PACK RFID KNEE ADD ON

## (undated) DEVICE — ***USE 57698*** SLEEVE FLOWTRON DVT CALF SINGLE USE

## (undated) DEVICE — BANDAGE ELASTIC 6IN X 5YDS MEDC

## (undated) DEVICE — CEMENT BONE SMART SET MV 40G: Type: IMPLANTABLE DEVICE | Site: KNEE | Status: NON-FUNCTIONAL

## (undated) DEVICE — VEST STERILE

## (undated) DEVICE — ***USE 140166***DRESSING MEPILEX BORDER AG 4X10

## (undated) DEVICE — NEEDLE DISP SPINAL 22GX3-1/2IN

## (undated) DEVICE — SOLN DURAPREP WAND

## (undated) DEVICE — COVER BACK TABLE REINFORCED

## (undated) DEVICE — NOZZLE 90 DEGREE TIBIAL PRESSURIZER

## (undated) DEVICE — CUFF TOURNIQUET DISP 34 X 4

## (undated) DEVICE — PENEVAC1 NONSTICK SMOKE EVAC

## (undated) DEVICE — KIT CEMENT SCULPS

## (undated) DEVICE — DRAPE LARGE REVERSE FOLD

## (undated) DEVICE — ***USE 138718*** SUTURE VICRYL 3-0  J442H

## (undated) DEVICE — ADHESIVE SKIN DERMABOND ADVANCED 0.7ML

## (undated) DEVICE — SOLN IRRIG .9%SOD 3L

## (undated) DEVICE — KIT LEG STABILIZATION SPIDER

## (undated) DEVICE — HOOD FLYTE SURGICOOL

## (undated) DEVICE — SUCTION KAMVAC MINI 20/BX

## (undated) DEVICE — PACK UNIVERSAL TOTAL JOINT

## (undated) DEVICE — BRUSH SCRUB WITH HIBICLENS

## (undated) DEVICE — GLOVE SZ 8.5 LINER PROTEXIS PI BL

## (undated) DEVICE — CONTAINER SPECIMEN STERILE 5OZ

## (undated) DEVICE — MANIFOLD FOUR PORT NEPTUNE

## (undated) DEVICE — SET HANDPIECE INTERPULSE

## (undated) DEVICE — DRAPE 3/4 REINFORCED

## (undated) DEVICE — BANDAGE ELASTIC 4IN MEDC

## (undated) DEVICE — SYRINGE DISP LUER-LOK 30 CC

## (undated) DEVICE — BANDAGE WOVEN ELASTIC BANDAGE 4IN X 5YDS MEDC

## (undated) DEVICE — KIT CEMENT MIXING CARTRIDGE AND NOZZLE

## (undated) DEVICE — SOLN IRRIG STER WATER 1000ML

## (undated) DEVICE — GOWN SURG X-LARGE MICROCOOL

## (undated) DEVICE — GLOVE PROTEXIS PI ORTHO 8.0

## (undated) DEVICE — BLADE SAGITTAL #151 STRYKER WIDE THICK

## (undated) DEVICE — WRAP COBAN LATEX FREE 6IN STERILE

## (undated) DEVICE — ***USE 138714*** SUTURE VICRYL 1 J341H CT-1